# Patient Record
Sex: MALE | Race: WHITE | Employment: OTHER | URBAN - METROPOLITAN AREA
[De-identification: names, ages, dates, MRNs, and addresses within clinical notes are randomized per-mention and may not be internally consistent; named-entity substitution may affect disease eponyms.]

---

## 2017-11-14 ENCOUNTER — APPOINTMENT (OUTPATIENT)
Dept: PHYSICAL THERAPY | Age: 62
End: 2017-11-14

## 2022-07-20 ENCOUNTER — HOSPITAL ENCOUNTER (OUTPATIENT)
Dept: PREADMISSION TESTING | Age: 67
Discharge: HOME | End: 2022-07-20
Payer: MEDICARE

## 2022-07-20 VITALS
SYSTOLIC BLOOD PRESSURE: 110 MMHG | OXYGEN SATURATION: 93 % | HEART RATE: 55 BPM | BODY MASS INDEX: 43.95 KG/M2 | HEIGHT: 68 IN | WEIGHT: 290 LBS | TEMPERATURE: 98.3 F | DIASTOLIC BLOOD PRESSURE: 64 MMHG | RESPIRATION RATE: 24 BRPM

## 2022-07-20 LAB
ALBUMIN SERPL-MCNC: 3.3 G/DL (ref 3.5–5)
ALBUMIN/GLOB SERPL: 1 {RATIO} (ref 1.1–2.2)
ALP SERPL-CCNC: 69 U/L (ref 45–117)
ALT SERPL-CCNC: 68 U/L (ref 12–78)
ANION GAP SERPL CALC-SCNC: 6 MMOL/L (ref 5–15)
APTT PPP: 26 SEC (ref 21.2–34.1)
AST SERPL W P-5'-P-CCNC: 37 U/L (ref 15–37)
ATRIAL RATE: 53 BPM
BASOPHILS # BLD: 0 K/UL (ref 0–0.1)
BASOPHILS NFR BLD: 0 % (ref 0–1)
BILIRUB SERPL-MCNC: 0.8 MG/DL (ref 0.2–1)
BUN SERPL-MCNC: 17 MG/DL (ref 6–20)
BUN/CREAT SERPL: 14 (ref 12–20)
CA-I BLD-MCNC: 8.7 MG/DL (ref 8.5–10.1)
CALCULATED P AXIS, ECG09: 35 DEGREES
CALCULATED R AXIS, ECG10: -33 DEGREES
CALCULATED T AXIS, ECG11: 91 DEGREES
CHLORIDE SERPL-SCNC: 106 MMOL/L (ref 97–108)
CO2 SERPL-SCNC: 26 MMOL/L (ref 21–32)
CREAT SERPL-MCNC: 1.21 MG/DL (ref 0.7–1.3)
DIAGNOSIS, 93000: NORMAL
DIFFERENTIAL METHOD BLD: ABNORMAL
EOSINOPHIL # BLD: 0.1 K/UL (ref 0–0.4)
EOSINOPHIL NFR BLD: 2 % (ref 0–7)
ERYTHROCYTE [DISTWIDTH] IN BLOOD BY AUTOMATED COUNT: 14.3 % (ref 11.5–14.5)
GLOBULIN SER CALC-MCNC: 3.4 G/DL (ref 2–4)
GLUCOSE SERPL-MCNC: 175 MG/DL (ref 65–100)
HCT VFR BLD AUTO: 36.1 % (ref 36.6–50.3)
HGB BLD-MCNC: 12.3 G/DL (ref 12.1–17)
IMM GRANULOCYTES # BLD AUTO: 0 K/UL (ref 0–0.04)
IMM GRANULOCYTES NFR BLD AUTO: 1 % (ref 0–0.5)
INR PPP: 1.1 (ref 0.9–1.1)
LYMPHOCYTES # BLD: 1.8 K/UL (ref 0.8–3.5)
LYMPHOCYTES NFR BLD: 28 % (ref 12–49)
MCH RBC QN AUTO: 33.5 PG (ref 26–34)
MCHC RBC AUTO-ENTMCNC: 34.1 G/DL (ref 30–36.5)
MCV RBC AUTO: 98.4 FL (ref 80–99)
MONOCYTES # BLD: 0.8 K/UL (ref 0–1)
MONOCYTES NFR BLD: 12 % (ref 5–13)
NEUTS SEG # BLD: 3.7 K/UL (ref 1.8–8)
NEUTS SEG NFR BLD: 57 % (ref 32–75)
NRBC # BLD: 0 K/UL (ref 0–0.01)
NRBC BLD-RTO: 0 PER 100 WBC
P-R INTERVAL, ECG05: 212 MS
PLATELET # BLD AUTO: 186 K/UL (ref 150–400)
PMV BLD AUTO: 10 FL (ref 8.9–12.9)
POTASSIUM SERPL-SCNC: 3.8 MMOL/L (ref 3.5–5.1)
PROT SERPL-MCNC: 6.7 G/DL (ref 6.4–8.2)
PROTHROMBIN TIME: 14.6 SEC (ref 11.9–14.6)
Q-T INTERVAL, ECG07: 522 MS
QRS DURATION, ECG06: 130 MS
QTC CALCULATION (BEZET), ECG08: 489 MS
RBC # BLD AUTO: 3.67 M/UL (ref 4.1–5.7)
SODIUM SERPL-SCNC: 138 MMOL/L (ref 136–145)
THERAPEUTIC RANGE,PTTT: NORMAL SEC (ref 82–109)
VENTRICULAR RATE, ECG03: 53 BPM
WBC # BLD AUTO: 6.4 K/UL (ref 4.1–11.1)

## 2022-07-20 PROCEDURE — 85610 PROTHROMBIN TIME: CPT

## 2022-07-20 PROCEDURE — 85730 THROMBOPLASTIN TIME PARTIAL: CPT

## 2022-07-20 PROCEDURE — 80053 COMPREHEN METABOLIC PANEL: CPT

## 2022-07-20 PROCEDURE — 93005 ELECTROCARDIOGRAM TRACING: CPT

## 2022-07-20 PROCEDURE — 85025 COMPLETE CBC W/AUTO DIFF WBC: CPT

## 2022-07-20 PROCEDURE — 36415 COLL VENOUS BLD VENIPUNCTURE: CPT

## 2022-07-20 RX ORDER — LANOLIN ALCOHOL/MO/W.PET/CERES
1000 CREAM (GRAM) TOPICAL DAILY
COMMUNITY

## 2022-07-20 RX ORDER — GABAPENTIN 300 MG/1
300 CAPSULE ORAL 3 TIMES DAILY
COMMUNITY
End: 2022-08-04

## 2022-07-20 RX ORDER — RANOLAZINE 1000 MG/1
1000 TABLET, EXTENDED RELEASE ORAL 2 TIMES DAILY
COMMUNITY

## 2022-07-20 RX ORDER — GUAIFENESIN 100 MG/5ML
81 LIQUID (ML) ORAL DAILY
COMMUNITY

## 2022-07-20 RX ORDER — CARVEDILOL 25 MG/1
25 TABLET ORAL 2 TIMES DAILY WITH MEALS
COMMUNITY
End: 2022-08-17

## 2022-07-20 RX ORDER — ATORVASTATIN CALCIUM 80 MG/1
80 TABLET, FILM COATED ORAL DAILY
COMMUNITY

## 2022-07-20 RX ORDER — BENZONATATE 100 MG/1
100 CAPSULE ORAL
COMMUNITY
End: 2022-08-04

## 2022-07-20 RX ORDER — ISOSORBIDE MONONITRATE 60 MG/1
60 TABLET, EXTENDED RELEASE ORAL
COMMUNITY
End: 2022-08-04

## 2022-07-20 RX ORDER — ZOLPIDEM TARTRATE 10 MG/1
TABLET ORAL
COMMUNITY

## 2022-07-20 RX ORDER — CHROMIUM PICOLINATE 200 MCG
TABLET ORAL
COMMUNITY

## 2022-07-20 RX ORDER — METFORMIN HYDROCHLORIDE 500 MG/1
500 TABLET ORAL 2 TIMES DAILY WITH MEALS
COMMUNITY
End: 2022-08-04

## 2022-07-20 RX ORDER — CETIRIZINE HCL 10 MG
10 TABLET ORAL
COMMUNITY
End: 2022-08-04

## 2022-07-20 RX ORDER — FUROSEMIDE 40 MG/1
40 TABLET ORAL DAILY
COMMUNITY
End: 2022-08-04

## 2022-07-20 RX ORDER — PAROXETINE HYDROCHLORIDE 40 MG/1
40 TABLET, FILM COATED ORAL DAILY
COMMUNITY

## 2022-07-20 RX ORDER — VALSARTAN 160 MG/1
160 TABLET ORAL DAILY
COMMUNITY
End: 2022-08-04

## 2022-07-20 RX ORDER — MELATONIN
1000 DAILY
COMMUNITY

## 2022-07-20 RX ORDER — FLUTICASONE PROPIONATE 50 MCG
2 SPRAY, SUSPENSION (ML) NASAL DAILY
COMMUNITY

## 2022-07-20 RX ORDER — CITALOPRAM 40 MG/1
40 TABLET, FILM COATED ORAL DAILY
COMMUNITY

## 2022-07-26 ENCOUNTER — HOSPITAL ENCOUNTER (OUTPATIENT)
Age: 67
Discharge: HOME | End: 2022-07-26
Attending: STUDENT IN AN ORGANIZED HEALTH CARE EDUCATION/TRAINING PROGRAM | Admitting: STUDENT IN AN ORGANIZED HEALTH CARE EDUCATION/TRAINING PROGRAM
Payer: MEDICARE

## 2022-07-26 VITALS
TEMPERATURE: 98.2 F | WEIGHT: 290 LBS | RESPIRATION RATE: 16 BRPM | DIASTOLIC BLOOD PRESSURE: 89 MMHG | BODY MASS INDEX: 43.95 KG/M2 | SYSTOLIC BLOOD PRESSURE: 156 MMHG | HEART RATE: 55 BPM | OXYGEN SATURATION: 93 % | HEIGHT: 68 IN

## 2022-07-26 DIAGNOSIS — I25.119 CORONARY ARTERY DISEASE WITH ANGINA PECTORIS, UNSPECIFIED VESSEL OR LESION TYPE, UNSPECIFIED WHETHER NATIVE OR TRANSPLANTED HEART (HCC): ICD-10-CM

## 2022-07-26 PROBLEM — I50.9 CHF (CONGESTIVE HEART FAILURE) (HCC): Status: ACTIVE | Noted: 2022-07-26

## 2022-07-26 LAB
GLUCOSE BLD STRIP.AUTO-MCNC: 203 MG/DL (ref 65–117)
GLUCOSE BLD STRIP.AUTO-MCNC: 210 MG/DL (ref 65–117)
PERFORMED BY, TECHID: ABNORMAL
PERFORMED BY, TECHID: ABNORMAL

## 2022-07-26 PROCEDURE — 77030016699 HC CATH ANGI DX INFN1 CARD -A: Performed by: STUDENT IN AN ORGANIZED HEALTH CARE EDUCATION/TRAINING PROGRAM

## 2022-07-26 PROCEDURE — 77030040934 HC CATH DIAG DXTERITY MEDT -A: Performed by: STUDENT IN AN ORGANIZED HEALTH CARE EDUCATION/TRAINING PROGRAM

## 2022-07-26 PROCEDURE — 99152 MOD SED SAME PHYS/QHP 5/>YRS: CPT | Performed by: STUDENT IN AN ORGANIZED HEALTH CARE EDUCATION/TRAINING PROGRAM

## 2022-07-26 PROCEDURE — C1769 GUIDE WIRE: HCPCS | Performed by: STUDENT IN AN ORGANIZED HEALTH CARE EDUCATION/TRAINING PROGRAM

## 2022-07-26 PROCEDURE — 99153 MOD SED SAME PHYS/QHP EA: CPT | Performed by: STUDENT IN AN ORGANIZED HEALTH CARE EDUCATION/TRAINING PROGRAM

## 2022-07-26 PROCEDURE — 93457 R HRT ART/GRFT ANGIO: CPT | Performed by: STUDENT IN AN ORGANIZED HEALTH CARE EDUCATION/TRAINING PROGRAM

## 2022-07-26 PROCEDURE — C1894 INTRO/SHEATH, NON-LASER: HCPCS | Performed by: STUDENT IN AN ORGANIZED HEALTH CARE EDUCATION/TRAINING PROGRAM

## 2022-07-26 PROCEDURE — 77030029997 HC DEV COM RDL R BND TELE -B: Performed by: STUDENT IN AN ORGANIZED HEALTH CARE EDUCATION/TRAINING PROGRAM

## 2022-07-26 PROCEDURE — 82962 GLUCOSE BLOOD TEST: CPT

## 2022-07-26 PROCEDURE — 74011000636 HC RX REV CODE- 636: Performed by: STUDENT IN AN ORGANIZED HEALTH CARE EDUCATION/TRAINING PROGRAM

## 2022-07-26 PROCEDURE — 77030019698 HC SYR ANGI MDLON MRTM -A: Performed by: STUDENT IN AN ORGANIZED HEALTH CARE EDUCATION/TRAINING PROGRAM

## 2022-07-26 PROCEDURE — 74011000250 HC RX REV CODE- 250: Performed by: STUDENT IN AN ORGANIZED HEALTH CARE EDUCATION/TRAINING PROGRAM

## 2022-07-26 PROCEDURE — 94761 N-INVAS EAR/PLS OXIMETRY MLT: CPT

## 2022-07-26 PROCEDURE — 76210000024 HC REC RM PH II 2.5 TO 3 HR: Performed by: STUDENT IN AN ORGANIZED HEALTH CARE EDUCATION/TRAINING PROGRAM

## 2022-07-26 PROCEDURE — 76210000017 HC OR PH I REC 1.5 TO 2 HR: Performed by: STUDENT IN AN ORGANIZED HEALTH CARE EDUCATION/TRAINING PROGRAM

## 2022-07-26 PROCEDURE — 76937 US GUIDE VASCULAR ACCESS: CPT | Performed by: STUDENT IN AN ORGANIZED HEALTH CARE EDUCATION/TRAINING PROGRAM

## 2022-07-26 PROCEDURE — 74011000250 HC RX REV CODE- 250: Performed by: NURSE PRACTITIONER

## 2022-07-26 PROCEDURE — 74011250636 HC RX REV CODE- 250/636: Performed by: STUDENT IN AN ORGANIZED HEALTH CARE EDUCATION/TRAINING PROGRAM

## 2022-07-26 PROCEDURE — C1760 CLOSURE DEV, VASC: HCPCS | Performed by: STUDENT IN AN ORGANIZED HEALTH CARE EDUCATION/TRAINING PROGRAM

## 2022-07-26 PROCEDURE — 94640 AIRWAY INHALATION TREATMENT: CPT

## 2022-07-26 RX ORDER — FENTANYL CITRATE 50 UG/ML
INJECTION, SOLUTION INTRAMUSCULAR; INTRAVENOUS AS NEEDED
Status: DISCONTINUED | OUTPATIENT
Start: 2022-07-26 | End: 2022-07-26 | Stop reason: HOSPADM

## 2022-07-26 RX ORDER — MIDAZOLAM HYDROCHLORIDE 1 MG/ML
INJECTION INTRAMUSCULAR; INTRAVENOUS AS NEEDED
Status: DISCONTINUED | OUTPATIENT
Start: 2022-07-26 | End: 2022-07-26 | Stop reason: HOSPADM

## 2022-07-26 RX ORDER — HEPARIN SODIUM 1000 [USP'U]/ML
INJECTION, SOLUTION INTRAVENOUS; SUBCUTANEOUS AS NEEDED
Status: DISCONTINUED | OUTPATIENT
Start: 2022-07-26 | End: 2022-07-26 | Stop reason: HOSPADM

## 2022-07-26 RX ORDER — SODIUM CHLORIDE 0.9 % (FLUSH) 0.9 %
5-40 SYRINGE (ML) INJECTION AS NEEDED
Status: CANCELLED | OUTPATIENT
Start: 2022-07-26

## 2022-07-26 RX ORDER — VERAPAMIL HYDROCHLORIDE 2.5 MG/ML
INJECTION, SOLUTION INTRAVENOUS AS NEEDED
Status: DISCONTINUED | OUTPATIENT
Start: 2022-07-26 | End: 2022-07-26 | Stop reason: HOSPADM

## 2022-07-26 RX ORDER — LIDOCAINE HYDROCHLORIDE 10 MG/ML
INJECTION INFILTRATION; PERINEURAL AS NEEDED
Status: DISCONTINUED | OUTPATIENT
Start: 2022-07-26 | End: 2022-07-26 | Stop reason: HOSPADM

## 2022-07-26 RX ORDER — SODIUM CHLORIDE 0.9 % (FLUSH) 0.9 %
5-40 SYRINGE (ML) INJECTION EVERY 8 HOURS
Status: CANCELLED | OUTPATIENT
Start: 2022-07-26

## 2022-07-26 RX ORDER — NITROGLYCERIN 5 MG/ML
INJECTION, SOLUTION INTRAVENOUS AS NEEDED
Status: DISCONTINUED | OUTPATIENT
Start: 2022-07-26 | End: 2022-07-26 | Stop reason: HOSPADM

## 2022-07-26 RX ORDER — CLOPIDOGREL BISULFATE 75 MG/1
75 TABLET ORAL
COMMUNITY
End: 2022-08-04

## 2022-07-26 RX ORDER — CALCIUM CITRATE/VITAMIN D3 200MG-6.25
TABLET ORAL SEE ADMIN INSTRUCTIONS
COMMUNITY

## 2022-07-26 RX ORDER — NITROGLYCERIN 20 MG/1
1 PATCH TRANSDERMAL DAILY
COMMUNITY
End: 2022-08-04

## 2022-07-26 RX ORDER — IPRATROPIUM BROMIDE AND ALBUTEROL SULFATE 2.5; .5 MG/3ML; MG/3ML
3 SOLUTION RESPIRATORY (INHALATION)
Status: COMPLETED | OUTPATIENT
Start: 2022-07-26 | End: 2022-07-26

## 2022-07-26 RX ORDER — HEPARIN SODIUM 200 [USP'U]/100ML
INJECTION, SOLUTION INTRAVENOUS
Status: COMPLETED | OUTPATIENT
Start: 2022-07-26 | End: 2022-07-26

## 2022-07-26 RX ORDER — SODIUM CHLORIDE 9 MG/ML
50 INJECTION, SOLUTION INTRAVENOUS CONTINUOUS
Status: DISCONTINUED | OUTPATIENT
Start: 2022-07-26 | End: 2022-07-26 | Stop reason: HOSPADM

## 2022-07-26 RX ORDER — PEN NEEDLE, DIABETIC 31 GX3/16"
NEEDLE, DISPOSABLE MISCELLANEOUS
COMMUNITY
End: 2022-08-04

## 2022-07-26 RX ADMIN — SODIUM CHLORIDE 50 ML/HR: 9 INJECTION, SOLUTION INTRAVENOUS at 09:42

## 2022-07-26 RX ADMIN — IPRATROPIUM BROMIDE AND ALBUTEROL SULFATE 3 ML: .5; 3 SOLUTION RESPIRATORY (INHALATION) at 10:03

## 2022-07-26 NOTE — PROGRESS NOTES
Pt stated has [employer], implanted 4 feb 2016 per pt card. Guidant placed 22 dec 2004. Per pt card.

## 2022-07-26 NOTE — PROGRESS NOTES
Patient states okay to review and give discharge instructions to  Lily Gleason, daughter, [phone].

## 2022-07-26 NOTE — Clinical Note
Contrast Dose Calculator:   Patient's age: 69.   Patient's sex: Male. Patient weight (kg) = 131.5. Creatinine level (mg/dL) = 1.21. Creatinine clearance (mL/min): 107. 17. Contrast concentration (mg/mL) = 370. MACD = 300 mL. Max Contrast dose per Creatinine Cl calculator = 241.13 mL.

## 2022-07-26 NOTE — PROGRESS NOTES
Device interrogation requested by Dr Allison Lazo and Mi Dickerson. Patient apparently has an ICD implanted several years ago and never seen EP since. Also had alarms from the device some time ago. Device type single chamber ICD  : [employer] life 7.5 yrs  Current ying settings: VVI 40 bpm  V pacing <1%  No arrhythmias    RV lead sensin.7 mV  Impedence 1142 ohms HV 70 ohms  Threshold 1.9 V @ 1.0 ms    No changes in programming including tachycardia zones    We will set patient up for remote monitoring and establish with [facility] EP clinic in due course.

## 2022-07-26 NOTE — Clinical Note
Right femoral artery. Accessed unsuccessfully. Micropuncture needle used. Using ultrasound guidance. Number of attempts =  3.

## 2022-07-26 NOTE — PROGRESS NOTES
Lisa Doe NP notified of pt blood sugar, implanted ICD [employer], and pt medications. She stated no new orders.

## 2022-07-26 NOTE — Clinical Note
TRANSFER - OUT REPORT:     Verbal report given to: Dany, (at bedside). Report consisted of patient's Situation, Background, Assessment and   Recommendations(SBAR). Opportunity for questions and clarification was provided. Patient transported with a Registered Nurse. Patient transported to: PACU.

## 2022-07-26 NOTE — DISCHARGE INSTRUCTIONS
[facility]  Cardiac Catheterization Department  [address], [address]  [phone]        Coronary Angiogram: What to Expect at [address] [facility]  A coronary angiogram is a test to examine the large blood vessels of your heart (coronary arteries). The doctor inserted a thin, flexible tube (catheter into a blood vessel in your groin or wrist.  Your groin or wrist may have a bruise and feel sore for a few days after the procedure. You can do light activities around the house. But do not do anything strenuous until your doctor says it is ok. This may be for several days. This care sheet gives you a general idea about how long it will take for you to recover. But each person recovers at a different pace. Follow the steps below to feel better as quickly as possible. How can you care for yourself at home? Activity  If the doctor gave you a sedative: For 24 hours, don't do anything that requires attention to detail, such as going to work, making important decisions, or signing any legal documents. It takes time for the medicine's effects to completely wear off. For your safety, do not drive or operate any machinery that could be dangerous. Wait until the medicine wears off and you can think clearly and react easily. Do not do any strenuous exercise and do not lift, pull, or push anything heavier than 5 pounds (approximately the weight of 1 gallon of milk) until your doctor says it is ok. This may be for several days. You can walk around the house and do light activity, such as cooking. If the catheter was placed in your groin and you must use stairs, just go up and down slowly in as few trips as possible for the first couple of days. If the catheter was placed in your wrist, do not bend your wrist deeply for the first couple of days. A soft wrist-splint may be placed on your wrist as a reminder following the procedure.   Be careful using your hand to get into and out of a chair or bed and when opening doors. Get regular exercise, after being cleared by your cardiologist.  Walking may be a good choice. Try for at least 30 minutes on most days of the week. If you smoke or use smokeless tobacco products, including vaping products, it is extremely important that you quit using these products. Please ask you nurse or doctor for more information about smoking cessation. Diet  Drink plenty of fluids to help you body flush out the dye. If you have kidney, heart, or liver disease and have to limit fluids, talk to your doctor before increasing the amount of fluids you drink. Keep eating a heart-healthy diet that has lots of fruits, vegetables, and whole grains. If you have not been eating this way, talk to your doctor. You also may want to talk to a dietician. This expert can help you to learn about healthy foods and plan meals. Medicines  Your doctor will tell you if and when you can restart your medicines. You will also be given instructions about taking any new medicines. Take these medicines as prescribed. Continue taking your cholesterol lowering medications (statins), if you have been prescribed this. You doctor may prescribe a blood-thinning medicine like aspirin or clopidogrel (Plavix), plasugrel (Effient), or ticagrelor (Brilinta). If you had angioplasty or a stent placement, you must continue aspirin and Plavix, Effient, or Brilinta. It is very important that you take these medicines exactly as directed to keep the coronary artery open and reduce your risk of heart attack. Be safe with medicines. Call your doctor if you think you are having a problem with taking or obtaining your medicines, notify your doctor immediately. You cannot afford to miss your medications. Do not stop taking these medications without speaking to your cardiologist first.   Do not strain to have a bowel movement.   Ask your doctor if you feel you may need a stool softener of laxative. Care of the catheter site  For 1 or 2 days, keep a bandage over the spot where the catheter(s) was inserted. The bandage probably will fall off in this time. Put ice or a cold pack on the area for 10 to 20 minutes at a time to help with soreness of swelling. Place a thin cloth between the ice and your skin. You may shower 24 to 48 hours after the procedure, if your doctor says it is okay. Pat the incision dry with a clean towel afterwards. Do not soak the catheter site until it is healed. Don't take a bath for 1 week, or until your doctor tells you it is okay to do so. The use of lotions and powders is not recommended at the site until it is completely healed. Watch for bleeding from the site. A small amount of blood (up to the size of a quarter) on the bandage can be normal.  If you cough, sneeze, or laugh vigorously, apply direct pressure with your hand over the catheter site. If you notice a little bit of blood from the catheter site (similar to a paper cut or shaving nick), lie down and press firmly on the area for 15 minutes to try and make it stop bleeding. If the bleeding does not stop, call your doctor or seek immediate medical care. If you notice heavy bleeding at the site that has either saturated the bandage or is squirting, lie down, press firmly on the site, and have someone call 911. Follow-up care is a key part of your treatment and safety. Be sure to make and go to all appointments and call your doctor if you are having problems. It's also a good idea to know your test results and keep a list of medicines you take. When should you call for help? Call 911 anytime you think you may need emergency care. For example, call if:  You passed out (lost consciousness). You have severe trouble breathing. You have sudden chest pain and shortness of breath, or you cough up blood.   Call 911 if you have symptoms of a heart attack, such as:  Chest Pain, pressure, squeezing, or burning. Sweating. Shortness of breath or difficulty breathing. Nausea or vomiting. Jaw pain, shoulder pain, or arm pain in one or both sides. Feelings of fullness. Excessive fatigue or weakness. New onset anxiety. New onset of upper back pain. Dizziness or lightheadedness. A fast or uneven pulse. Call 911 if you have been diagnosed with angina, and you have symptoms that do not go away with rest or are not getting better within 5 minutes of taking a dose of your nitroglycerin. After you call 911, the  may tell you to chew 1 adult-strength (325 mg) of 2 to 4 low-dose aspirin (81 mg). Wait for ambulance. Do not drive yourself. Call your doctor now or seek immediate medical care if:  You are bleeding from the area where the catheter was inserted. You have a fast-growing, painful lump at the catheter site. You have signs of infection, such as  Increased pain, swelling, warmth or redness at the catheter site. Red streaks leading from the catheter site. Pus draining from the catheter site. A fever. Your leg or hand is painful, looks blue, or feels cold, numb, or tingly. Watch closely for changes in your health and be sure to contact your doctor if you have any problems.

## 2022-07-26 NOTE — Clinical Note
Right femoral artery. Accessed unsuccessfully. Femoral access needle used. Using ultrasound guidance.  Number of attempts =  1.

## 2022-08-03 ENCOUNTER — HOSPITAL ENCOUNTER (INPATIENT)
Age: 67
LOS: 7 days | Discharge: HOME HEALTH | DRG: 515 | End: 2022-08-17
Attending: STUDENT IN AN ORGANIZED HEALTH CARE EDUCATION/TRAINING PROGRAM | Admitting: INTERNAL MEDICINE
Payer: MEDICARE

## 2022-08-03 ENCOUNTER — APPOINTMENT (OUTPATIENT)
Dept: GENERAL RADIOLOGY | Age: 67
DRG: 515 | End: 2022-08-03
Attending: STUDENT IN AN ORGANIZED HEALTH CARE EDUCATION/TRAINING PROGRAM
Payer: MEDICARE

## 2022-08-03 ENCOUNTER — APPOINTMENT (OUTPATIENT)
Dept: CT IMAGING | Age: 67
DRG: 515 | End: 2022-08-03
Attending: STUDENT IN AN ORGANIZED HEALTH CARE EDUCATION/TRAINING PROGRAM
Payer: MEDICARE

## 2022-08-03 DIAGNOSIS — S32.030G CLOSED COMPRESSION FRACTURE OF L3 LUMBAR VERTEBRA WITH DELAYED HEALING, SUBSEQUENT ENCOUNTER: ICD-10-CM

## 2022-08-03 DIAGNOSIS — R55 SYNCOPE AND COLLAPSE: Primary | ICD-10-CM
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
## 2022-08-03 LAB
ABO + RH BLD: NORMAL
ALBUMIN SERPL-MCNC: 3.6 G/DL (ref 3.5–5)
ALBUMIN/GLOB SERPL: 1.1 {RATIO} (ref 1.1–2.2)
ALP SERPL-CCNC: 68 U/L (ref 45–117)
ALT SERPL-CCNC: 59 U/L (ref 12–78)
ANION GAP SERPL CALC-SCNC: 12 MMOL/L (ref 5–15)
AST SERPL W P-5'-P-CCNC: 43 U/L (ref 15–37)
BILIRUB SERPL-MCNC: 0.5 MG/DL (ref 0.2–1)
BLOOD GROUP ANTIBODIES SERPL: NEGATIVE
BUN SERPL-MCNC: 30 MG/DL (ref 6–20)
BUN/CREAT SERPL: 14 (ref 12–20)
CA-I BLD-MCNC: 8.8 MG/DL (ref 8.5–10.1)
CHLORIDE SERPL-SCNC: 106 MMOL/L (ref 97–108)
CO2 SERPL-SCNC: 21 MMOL/L (ref 21–32)
CREAT SERPL-MCNC: 2.15 MG/DL (ref 0.7–1.3)
ERYTHROCYTE [DISTWIDTH] IN BLOOD BY AUTOMATED COUNT: 14 % (ref 11.5–14.5)
ETHANOL SERPL-MCNC: 150 MG/DL
GLOBULIN SER CALC-MCNC: 3.4 G/DL (ref 2–4)
GLUCOSE SERPL-MCNC: 157 MG/DL (ref 65–100)
HCT VFR BLD AUTO: 37.2 % (ref 36.6–50.3)
HGB BLD-MCNC: 12.7 G/DL (ref 12.1–17)
LACTATE SERPL-SCNC: 4.1 MMOL/L (ref 0.4–2)
LIPASE SERPL-CCNC: 71 U/L (ref 73–393)
MCH RBC QN AUTO: 33.7 PG (ref 26–34)
MCHC RBC AUTO-ENTMCNC: 34.1 G/DL (ref 30–36.5)
MCV RBC AUTO: 98.7 FL (ref 80–99)
NRBC # BLD: 0 K/UL (ref 0–0.01)
NRBC BLD-RTO: 0 PER 100 WBC
PLATELET # BLD AUTO: 206 K/UL (ref 150–400)
PMV BLD AUTO: 9.6 FL (ref 8.9–12.9)
POTASSIUM SERPL-SCNC: 3.5 MMOL/L (ref 3.5–5.1)
PROT SERPL-MCNC: 7 G/DL (ref 6.4–8.2)
RBC # BLD AUTO: 3.77 M/UL (ref 4.1–5.7)
SODIUM SERPL-SCNC: 139 MMOL/L (ref 136–145)
SPECIMEN EXP DATE BLD: NORMAL
TROPONIN-HIGH SENSITIVITY: 15 NG/L (ref 0–76)
WBC # BLD AUTO: 7.5 K/UL (ref 4.1–11.1)

## 2022-08-03 PROCEDURE — 70450 CT HEAD/BRAIN W/O DYE: CPT

## 2022-08-03 PROCEDURE — 83605 ASSAY OF LACTIC ACID: CPT

## 2022-08-03 PROCEDURE — 82077 ASSAY SPEC XCP UR&BREATH IA: CPT

## 2022-08-03 PROCEDURE — 96374 THER/PROPH/DIAG INJ IV PUSH: CPT

## 2022-08-03 PROCEDURE — 85027 COMPLETE CBC AUTOMATED: CPT

## 2022-08-03 PROCEDURE — 93005 ELECTROCARDIOGRAM TRACING: CPT

## 2022-08-03 PROCEDURE — 86900 BLOOD TYPING SEROLOGIC ABO: CPT

## 2022-08-03 PROCEDURE — 84443 ASSAY THYROID STIM HORMONE: CPT

## 2022-08-03 PROCEDURE — 99285 EMERGENCY DEPT VISIT HI MDM: CPT

## 2022-08-03 PROCEDURE — 83690 ASSAY OF LIPASE: CPT

## 2022-08-03 PROCEDURE — 71275 CT ANGIOGRAPHY CHEST: CPT

## 2022-08-03 PROCEDURE — 74011000636 HC RX REV CODE- 636: Performed by: STUDENT IN AN ORGANIZED HEALTH CARE EDUCATION/TRAINING PROGRAM

## 2022-08-03 PROCEDURE — 84484 ASSAY OF TROPONIN QUANT: CPT

## 2022-08-03 PROCEDURE — 71045 X-RAY EXAM CHEST 1 VIEW: CPT

## 2022-08-03 PROCEDURE — 36415 COLL VENOUS BLD VENIPUNCTURE: CPT

## 2022-08-03 PROCEDURE — 74011250636 HC RX REV CODE- 250/636: Performed by: STUDENT IN AN ORGANIZED HEALTH CARE EDUCATION/TRAINING PROGRAM

## 2022-08-03 PROCEDURE — 96372 THER/PROPH/DIAG INJ SC/IM: CPT

## 2022-08-03 PROCEDURE — 80053 COMPREHEN METABOLIC PANEL: CPT

## 2022-08-03 PROCEDURE — 72125 CT NECK SPINE W/O DYE: CPT

## 2022-08-03 RX ORDER — MORPHINE SULFATE 4 MG/ML
4 INJECTION INTRAVENOUS ONCE
Status: COMPLETED | OUTPATIENT
Start: 2022-08-03 | End: 2022-08-03

## 2022-08-03 RX ORDER — FENTANYL CITRATE 50 UG/ML
50 INJECTION, SOLUTION INTRAMUSCULAR; INTRAVENOUS
Status: COMPLETED | OUTPATIENT
Start: 2022-08-03 | End: 2022-08-03

## 2022-08-03 RX ADMIN — SODIUM CHLORIDE 1000 ML: 9 INJECTION, SOLUTION INTRAVENOUS at 22:52

## 2022-08-03 RX ADMIN — FENTANYL CITRATE 50 MCG: 50 INJECTION INTRAMUSCULAR; INTRAVENOUS at 21:20

## 2022-08-03 RX ADMIN — IOPAMIDOL 100 ML: 755 INJECTION, SOLUTION INTRAVENOUS at 21:40

## 2022-08-03 RX ADMIN — MORPHINE SULFATE 4 MG: 4 INJECTION INTRAVENOUS at 22:49

## 2022-08-04 ENCOUNTER — APPOINTMENT (OUTPATIENT)
Dept: NON INVASIVE DIAGNOSTICS | Age: 67
DRG: 515 | End: 2022-08-04
Attending: HOSPITALIST
Payer: MEDICARE

## 2022-08-04 PROBLEM — R55 SYNCOPE: Status: ACTIVE | Noted: 2022-08-04

## 2022-08-04 PROBLEM — R42 DIZZINESS: Status: ACTIVE | Noted: 2022-08-04

## 2022-08-04 LAB
ANION GAP SERPL CALC-SCNC: 6 MMOL/L (ref 5–15)
ATRIAL RATE: 65 BPM
BUN SERPL-MCNC: 16 MG/DL (ref 6–20)
BUN/CREAT SERPL: 16 (ref 12–20)
CA-I BLD-MCNC: 9 MG/DL (ref 8.5–10.1)
CALCULATED P AXIS, ECG09: 62 DEGREES
CALCULATED R AXIS, ECG10: -13 DEGREES
CALCULATED T AXIS, ECG11: 109 DEGREES
CHLORIDE SERPL-SCNC: 106 MMOL/L (ref 97–108)
CO2 SERPL-SCNC: 25 MMOL/L (ref 21–32)
CREAT SERPL-MCNC: 0.98 MG/DL (ref 0.7–1.3)
DIAGNOSIS, 93000: NORMAL
ERYTHROCYTE [DISTWIDTH] IN BLOOD BY AUTOMATED COUNT: 14.2 % (ref 11.5–14.5)
GLUCOSE BLD STRIP.AUTO-MCNC: 155 MG/DL (ref 65–117)
GLUCOSE BLD STRIP.AUTO-MCNC: 182 MG/DL (ref 65–117)
GLUCOSE BLD STRIP.AUTO-MCNC: 207 MG/DL (ref 65–117)
GLUCOSE SERPL-MCNC: 174 MG/DL (ref 65–100)
HCT VFR BLD AUTO: 37.6 % (ref 36.6–50.3)
HGB BLD-MCNC: 12.9 G/DL (ref 12.1–17)
LACTATE SERPL-SCNC: 2.4 MMOL/L (ref 0.4–2)
MCH RBC QN AUTO: 33.8 PG (ref 26–34)
MCHC RBC AUTO-ENTMCNC: 34.3 G/DL (ref 30–36.5)
MCV RBC AUTO: 98.4 FL (ref 80–99)
NRBC # BLD: 0 K/UL (ref 0–0.01)
NRBC BLD-RTO: 0 PER 100 WBC
P-R INTERVAL, ECG05: 220 MS
PERFORMED BY, TECHID: ABNORMAL
PLATELET # BLD AUTO: 175 K/UL (ref 150–400)
PMV BLD AUTO: 9.8 FL (ref 8.9–12.9)
POTASSIUM SERPL-SCNC: 3.6 MMOL/L (ref 3.5–5.1)
Q-T INTERVAL, ECG07: 430 MS
QRS DURATION, ECG06: 138 MS
QTC CALCULATION (BEZET), ECG08: 447 MS
RBC # BLD AUTO: 3.82 M/UL (ref 4.1–5.7)
SODIUM SERPL-SCNC: 137 MMOL/L (ref 136–145)
TSH SERPL DL<=0.05 MIU/L-ACNC: 3.83 UIU/ML (ref 0.36–3.74)
VENTRICULAR RATE, ECG03: 65 BPM
WBC # BLD AUTO: 10.9 K/UL (ref 4.1–11.1)

## 2022-08-04 PROCEDURE — 83605 ASSAY OF LACTIC ACID: CPT

## 2022-08-04 PROCEDURE — G0378 HOSPITAL OBSERVATION PER HR: HCPCS

## 2022-08-04 PROCEDURE — 96375 TX/PRO/DX INJ NEW DRUG ADDON: CPT

## 2022-08-04 PROCEDURE — 77010033678 HC OXYGEN DAILY

## 2022-08-04 PROCEDURE — 74011250636 HC RX REV CODE- 250/636: Performed by: INTERNAL MEDICINE

## 2022-08-04 PROCEDURE — 82962 GLUCOSE BLOOD TEST: CPT

## 2022-08-04 PROCEDURE — 74011250637 HC RX REV CODE- 250/637: Performed by: HOSPITALIST

## 2022-08-04 PROCEDURE — 74011000250 HC RX REV CODE- 250: Performed by: HOSPITALIST

## 2022-08-04 PROCEDURE — 96372 THER/PROPH/DIAG INJ SC/IM: CPT

## 2022-08-04 PROCEDURE — 94761 N-INVAS EAR/PLS OXIMETRY MLT: CPT

## 2022-08-04 PROCEDURE — 5A09457 ASSISTANCE WITH RESPIRATORY VENTILATION, 24-96 CONSECUTIVE HOURS, CONTINUOUS POSITIVE AIRWAY PRESSURE: ICD-10-PCS | Performed by: INTERNAL MEDICINE

## 2022-08-04 PROCEDURE — 85027 COMPLETE CBC AUTOMATED: CPT

## 2022-08-04 PROCEDURE — 74011250636 HC RX REV CODE- 250/636: Performed by: STUDENT IN AN ORGANIZED HEALTH CARE EDUCATION/TRAINING PROGRAM

## 2022-08-04 PROCEDURE — 74011250636 HC RX REV CODE- 250/636: Performed by: HOSPITALIST

## 2022-08-04 PROCEDURE — 36415 COLL VENOUS BLD VENIPUNCTURE: CPT

## 2022-08-04 PROCEDURE — 94660 CPAP INITIATION&MGMT: CPT

## 2022-08-04 PROCEDURE — C8929 TTE W OR WO FOL WCON,DOPPLER: HCPCS

## 2022-08-04 PROCEDURE — 74011636637 HC RX REV CODE- 636/637: Performed by: HOSPITALIST

## 2022-08-04 PROCEDURE — 80048 BASIC METABOLIC PNL TOTAL CA: CPT

## 2022-08-04 PROCEDURE — 96376 TX/PRO/DX INJ SAME DRUG ADON: CPT

## 2022-08-04 RX ORDER — ENOXAPARIN SODIUM 100 MG/ML
40 INJECTION SUBCUTANEOUS EVERY 24 HOURS
Status: DISCONTINUED | OUTPATIENT
Start: 2022-08-04 | End: 2022-08-04 | Stop reason: SDUPTHER

## 2022-08-04 RX ORDER — ALBUTEROL SULFATE 90 UG/1
2 AEROSOL, METERED RESPIRATORY (INHALATION)
Status: DISCONTINUED | OUTPATIENT
Start: 2022-08-04 | End: 2022-08-17 | Stop reason: HOSPADM

## 2022-08-04 RX ORDER — FLUTICASONE PROPIONATE 50 MCG
2 SPRAY, SUSPENSION (ML) NASAL DAILY
Status: DISCONTINUED | OUTPATIENT
Start: 2022-08-04 | End: 2022-08-17 | Stop reason: HOSPADM

## 2022-08-04 RX ORDER — LANOLIN ALCOHOL/MO/W.PET/CERES
1000 CREAM (GRAM) TOPICAL DAILY
Status: DISCONTINUED | OUTPATIENT
Start: 2022-08-04 | End: 2022-08-17 | Stop reason: HOSPADM

## 2022-08-04 RX ORDER — INSULIN LISPRO 100 [IU]/ML
INJECTION, SOLUTION INTRAVENOUS; SUBCUTANEOUS
Status: DISCONTINUED | OUTPATIENT
Start: 2022-08-04 | End: 2022-08-17 | Stop reason: HOSPADM

## 2022-08-04 RX ORDER — GUAIFENESIN 100 MG/5ML
81 LIQUID (ML) ORAL DAILY
Status: DISCONTINUED | OUTPATIENT
Start: 2022-08-04 | End: 2022-08-17 | Stop reason: HOSPADM

## 2022-08-04 RX ORDER — AMLODIPINE BESYLATE 5 MG/1
10 TABLET ORAL DAILY
Status: DISCONTINUED | OUTPATIENT
Start: 2022-08-04 | End: 2022-08-17 | Stop reason: HOSPADM

## 2022-08-04 RX ORDER — INSULIN GLARGINE 100 [IU]/ML
15 INJECTION, SOLUTION SUBCUTANEOUS
COMMUNITY
Start: 2022-08-03

## 2022-08-04 RX ORDER — ENOXAPARIN SODIUM 100 MG/ML
30 INJECTION SUBCUTANEOUS EVERY 12 HOURS
Status: DISCONTINUED | OUTPATIENT
Start: 2022-08-04 | End: 2022-08-17 | Stop reason: HOSPADM

## 2022-08-04 RX ORDER — RANOLAZINE 500 MG/1
1000 TABLET, EXTENDED RELEASE ORAL 2 TIMES DAILY
Status: DISCONTINUED | OUTPATIENT
Start: 2022-08-04 | End: 2022-08-17 | Stop reason: HOSPADM

## 2022-08-04 RX ORDER — HYDROMORPHONE HYDROCHLORIDE 1 MG/ML
1 INJECTION, SOLUTION INTRAMUSCULAR; INTRAVENOUS; SUBCUTANEOUS
Status: DISPENSED | OUTPATIENT
Start: 2022-08-04 | End: 2022-08-07

## 2022-08-04 RX ORDER — MAGNESIUM SULFATE 100 %
4 CRYSTALS MISCELLANEOUS AS NEEDED
Status: DISCONTINUED | OUTPATIENT
Start: 2022-08-04 | End: 2022-08-17 | Stop reason: HOSPADM

## 2022-08-04 RX ORDER — DEXTROSE MONOHYDRATE 100 MG/ML
0-250 INJECTION, SOLUTION INTRAVENOUS AS NEEDED
Status: DISCONTINUED | OUTPATIENT
Start: 2022-08-04 | End: 2022-08-17 | Stop reason: HOSPADM

## 2022-08-04 RX ORDER — POLYETHYLENE GLYCOL 3350 17 G/17G
17 POWDER, FOR SOLUTION ORAL DAILY PRN
Status: DISCONTINUED | OUTPATIENT
Start: 2022-08-04 | End: 2022-08-17 | Stop reason: HOSPADM

## 2022-08-04 RX ORDER — VITAMIN B COMPLEX
1 CAPSULE ORAL DAILY
Status: DISCONTINUED | OUTPATIENT
Start: 2022-08-04 | End: 2022-08-17 | Stop reason: HOSPADM

## 2022-08-04 RX ORDER — ACETAMINOPHEN 325 MG/1
650 TABLET ORAL
Status: DISCONTINUED | OUTPATIENT
Start: 2022-08-04 | End: 2022-08-10

## 2022-08-04 RX ORDER — INSULIN GLARGINE 100 [IU]/ML
16 INJECTION, SOLUTION SUBCUTANEOUS
Status: DISCONTINUED | OUTPATIENT
Start: 2022-08-04 | End: 2022-08-17 | Stop reason: HOSPADM

## 2022-08-04 RX ORDER — MELATONIN
1000 DAILY
Status: DISCONTINUED | OUTPATIENT
Start: 2022-08-04 | End: 2022-08-17 | Stop reason: HOSPADM

## 2022-08-04 RX ORDER — ZOLPIDEM TARTRATE 5 MG/1
5 TABLET ORAL
Status: DISCONTINUED | OUTPATIENT
Start: 2022-08-04 | End: 2022-08-17 | Stop reason: HOSPADM

## 2022-08-04 RX ORDER — MORPHINE SULFATE 4 MG/ML
4 INJECTION INTRAVENOUS ONCE
Status: COMPLETED | OUTPATIENT
Start: 2022-08-04 | End: 2022-08-04

## 2022-08-04 RX ORDER — CITALOPRAM 20 MG/1
40 TABLET, FILM COATED ORAL DAILY
Status: DISCONTINUED | OUTPATIENT
Start: 2022-08-04 | End: 2022-08-17 | Stop reason: HOSPADM

## 2022-08-04 RX ORDER — SODIUM CHLORIDE 0.9 % (FLUSH) 0.9 %
5-40 SYRINGE (ML) INJECTION EVERY 8 HOURS
Status: DISCONTINUED | OUTPATIENT
Start: 2022-08-04 | End: 2022-08-13

## 2022-08-04 RX ORDER — ALBUTEROL SULFATE 90 UG/1
2 AEROSOL, METERED RESPIRATORY (INHALATION)
COMMUNITY
Start: 2022-06-13

## 2022-08-04 RX ORDER — DOCUSATE SODIUM 100 MG/1
100 CAPSULE, LIQUID FILLED ORAL 2 TIMES DAILY
Status: DISCONTINUED | OUTPATIENT
Start: 2022-08-04 | End: 2022-08-17 | Stop reason: HOSPADM

## 2022-08-04 RX ORDER — CALCIUM CARBONATE/VITAMIN D3 600MG-5MCG
1 TABLET ORAL 2 TIMES DAILY WITH MEALS
Status: DISCONTINUED | OUTPATIENT
Start: 2022-08-04 | End: 2022-08-17 | Stop reason: HOSPADM

## 2022-08-04 RX ORDER — TRAMADOL HYDROCHLORIDE 50 MG/1
50 TABLET ORAL
Status: DISCONTINUED | OUTPATIENT
Start: 2022-08-04 | End: 2022-08-10

## 2022-08-04 RX ORDER — HYDROMORPHONE HYDROCHLORIDE 1 MG/ML
1 INJECTION, SOLUTION INTRAMUSCULAR; INTRAVENOUS; SUBCUTANEOUS ONCE
Status: COMPLETED | OUTPATIENT
Start: 2022-08-04 | End: 2022-08-04

## 2022-08-04 RX ORDER — AMLODIPINE BESYLATE 10 MG/1
10 TABLET ORAL DAILY
COMMUNITY
Start: 2022-07-20

## 2022-08-04 RX ORDER — PAROXETINE HYDROCHLORIDE 20 MG/1
40 TABLET, FILM COATED ORAL DAILY
Status: DISCONTINUED | OUTPATIENT
Start: 2022-08-04 | End: 2022-08-17 | Stop reason: HOSPADM

## 2022-08-04 RX ORDER — ATORVASTATIN CALCIUM 40 MG/1
80 TABLET, FILM COATED ORAL DAILY
Status: DISCONTINUED | OUTPATIENT
Start: 2022-08-04 | End: 2022-08-17 | Stop reason: HOSPADM

## 2022-08-04 RX ORDER — SODIUM CHLORIDE 0.9 % (FLUSH) 0.9 %
5-40 SYRINGE (ML) INJECTION AS NEEDED
Status: DISCONTINUED | OUTPATIENT
Start: 2022-08-04 | End: 2022-08-17 | Stop reason: HOSPADM

## 2022-08-04 RX ADMIN — Medication 1000 UNITS: at 09:26

## 2022-08-04 RX ADMIN — SODIUM CHLORIDE, PRESERVATIVE FREE 10 ML: 5 INJECTION INTRAVENOUS at 22:38

## 2022-08-04 RX ADMIN — CITALOPRAM HYDROBROMIDE 40 MG: 20 TABLET ORAL at 09:26

## 2022-08-04 RX ADMIN — HYDROMORPHONE HYDROCHLORIDE 1 MG: 1 INJECTION, SOLUTION INTRAMUSCULAR; INTRAVENOUS; SUBCUTANEOUS at 20:17

## 2022-08-04 RX ADMIN — CYANOCOBALAMIN TAB 1000 MCG 1000 MCG: 1000 TAB at 09:27

## 2022-08-04 RX ADMIN — RANOLAZINE 1000 MG: 500 TABLET, FILM COATED, EXTENDED RELEASE ORAL at 09:27

## 2022-08-04 RX ADMIN — ENOXAPARIN SODIUM 30 MG: 100 INJECTION SUBCUTANEOUS at 22:38

## 2022-08-04 RX ADMIN — TRAMADOL HYDROCHLORIDE 50 MG: 50 TABLET, COATED ORAL at 09:26

## 2022-08-04 RX ADMIN — ENOXAPARIN SODIUM 30 MG: 100 INJECTION SUBCUTANEOUS at 14:06

## 2022-08-04 RX ADMIN — HYDROMORPHONE HYDROCHLORIDE 1 MG: 1 INJECTION, SOLUTION INTRAMUSCULAR; INTRAVENOUS; SUBCUTANEOUS at 05:07

## 2022-08-04 RX ADMIN — MORPHINE SULFATE 4 MG: 4 INJECTION INTRAVENOUS at 01:18

## 2022-08-04 RX ADMIN — TRAMADOL HYDROCHLORIDE 50 MG: 50 TABLET, COATED ORAL at 15:19

## 2022-08-04 RX ADMIN — RANOLAZINE 1000 MG: 500 TABLET, FILM COATED, EXTENDED RELEASE ORAL at 22:38

## 2022-08-04 RX ADMIN — DOCUSATE SODIUM 100 MG: 100 CAPSULE, LIQUID FILLED ORAL at 09:27

## 2022-08-04 RX ADMIN — INSULIN LISPRO 4 UNITS: 100 INJECTION, SOLUTION INTRAVENOUS; SUBCUTANEOUS at 09:27

## 2022-08-04 RX ADMIN — DOCUSATE SODIUM 100 MG: 100 CAPSULE, LIQUID FILLED ORAL at 22:38

## 2022-08-04 RX ADMIN — ATORVASTATIN CALCIUM 80 MG: 40 TABLET, FILM COATED ORAL at 09:27

## 2022-08-04 RX ADMIN — SODIUM CHLORIDE, PRESERVATIVE FREE 10 ML: 5 INJECTION INTRAVENOUS at 14:07

## 2022-08-04 RX ADMIN — PAROXETINE HYDROCHLORIDE 40 MG: 20 TABLET, FILM COATED ORAL at 14:06

## 2022-08-04 RX ADMIN — Medication 1 CAPSULE: at 09:26

## 2022-08-04 RX ADMIN — AMLODIPINE BESYLATE 10 MG: 5 TABLET ORAL at 09:26

## 2022-08-04 RX ADMIN — INSULIN LISPRO 3 UNITS: 100 INJECTION, SOLUTION INTRAVENOUS; SUBCUTANEOUS at 18:19

## 2022-08-04 RX ADMIN — Medication 1 TABLET: at 09:27

## 2022-08-04 RX ADMIN — ASPIRIN 81 MG CHEWABLE TABLET 81 MG: 81 TABLET CHEWABLE at 09:27

## 2022-08-04 NOTE — PROGRESS NOTES
Spoke with pt daughter [address] who states she has been assisting with pt's care at home and has noticed decline in pt function and ability to perform daily activities. Pt daughter prefer for pt to go to SNF/IRF at discharge for rehabilitation.
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
## 2022-08-04 NOTE — PROGRESS NOTES
State Observation Notice (SON)/ [place] Outpatient Observation Notice (Kareem Backers) provided to patient/representative with verbal explanation of the notice. Time allotted for questions regarding the notice. Patient /representative provided a completed copy of the SON/VOON notice. Copy placed on bedside chart.

## 2022-08-04 NOTE — ED NOTES
.. TRANSFER - OUT REPORT:    Verbal report given to KASSIE Posada (name) on Petey Colmenares  being transferred to [address] [place] [address] on 5W (unit) for routine progression of care       Report consisted of patients Situation, Background, Assessment and   Recommendations(SBAR). Information from the following report(s) SBAR, ED Summary, and MAR was reviewed with the receiving nurse. Lines:   Peripheral IV 08/03/22 Left Forearm (Active)   Site Assessment Clean, dry, & intact 08/03/22 2115   Phlebitis Assessment 0 08/03/22 2115   Infiltration Assessment 0 08/03/22 2115   Dressing Status Clean, dry, & intact 08/03/22 2115   Hub Color/Line Status Green 08/03/22 2115   Action Taken Blood drawn 08/03/22 2115       Peripheral IV 08/03/22 Right Forearm (Active)   Site Assessment Clean, dry, & intact 08/03/22 2116   Phlebitis Assessment 0 08/03/22 2116   Infiltration Assessment 0 08/03/22 2116   Dressing Status Clean, dry, & intact 08/03/22 2116   Hub Color/Line Status Pink 08/03/22 2116   Action Taken Blood drawn 08/03/22 2116        Opportunity for questions and clarification was provided.       Patient transported with:   [employer]
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
## 2022-08-04 NOTE — PROGRESS NOTES
Reason for Admission:  Syncope                     RUR Score:   N/A                  Plan for utilizing home health: Declines         PCP: First and Last name:  Shelby Abel MD     Name of Practice:    Are you a current patient: Yes/No:    Approximate date of last visit: A few months ago   Can you participate in a virtual visit with your PCP:                     Current Advanced Directive/Advance Care Plan: Full Code  CM confirmed with patient that he is a Full Code    Healthcare Decision Maker:   Click here to complete [employer] including selection of the Healthcare Decision Maker Relationship (ie \"Primary\")           DaughterAlex, [phone]                  Transition of Care Plan:                    CM met with patient at bedside. Patient reports that he lives at home with his daughter. He lives in the basement and there are no steps or ramp to enter his section of the home. Patient has a walker and CPAP at home, but is typically independent in care and ambulation. Patient has never had HH, IRF or SNF services in the past.  Patient's daughter will be his ride home at discharge and patient can transport himself to follow up appointments at discharge. Patient uses the [facility] in [place] . Current Dispo: Home/self.

## 2022-08-05 LAB
ANION GAP SERPL CALC-SCNC: 7 MMOL/L (ref 5–15)
BUN SERPL-MCNC: 15 MG/DL (ref 6–20)
BUN/CREAT SERPL: 14 (ref 12–20)
CA-I BLD-MCNC: 8.9 MG/DL (ref 8.5–10.1)
CHLORIDE SERPL-SCNC: 105 MMOL/L (ref 97–108)
CO2 SERPL-SCNC: 25 MMOL/L (ref 21–32)
CREAT SERPL-MCNC: 1.05 MG/DL (ref 0.7–1.3)
ECHO AO ASC DIAM: 4.3 CM
ECHO AO ROOT DIAM: 3.6 CM
ECHO AO SINUS VALSALVA DIAM: 3.3 CM
ECHO AV AREA PEAK VELOCITY: 3.1 CM2
ECHO AV AREA VTI: 2.9 CM2
ECHO AV MEAN GRADIENT: 5 MMHG
ECHO AV MEAN VELOCITY: 1 M/S
ECHO AV PEAK GRADIENT: 10 MMHG
ECHO AV PEAK VELOCITY: 1.6 M/S
ECHO AV VELOCITY RATIO: 0.69
ECHO AV VTI: 25.1 CM
ECHO EST RA PRESSURE: 8 MMHG
ECHO LA DIAMETER: 4.5 CM
ECHO LA TO AORTIC ROOT RATIO: 1.25
ECHO LV E' LATERAL VELOCITY: 13 CM/S
ECHO LV E' SEPTAL VELOCITY: 10 CM/S
ECHO LV FRACTIONAL SHORTENING: 3 % (ref 28–44)
ECHO LV INTERNAL DIMENSION DIASTOLIC: 6 CM (ref 4.2–5.9)
ECHO LV INTERNAL DIMENSION SYSTOLIC: 5.8 CM
ECHO LV IVSD: 1.2 CM (ref 0.6–1)
ECHO LV MASS 2D: 350.1 G (ref 88–224)
ECHO LV POSTERIOR WALL DIASTOLIC: 1.4 CM (ref 0.6–1)
ECHO LV RELATIVE WALL THICKNESS RATIO: 0.47
ECHO LVOT AREA: 4.5 CM2
ECHO LVOT AV VTI INDEX: 0.64
ECHO LVOT DIAM: 2.4 CM
ECHO LVOT MEAN GRADIENT: 3 MMHG
ECHO LVOT PEAK GRADIENT: 5 MMHG
ECHO LVOT PEAK VELOCITY: 1.1 M/S
ECHO LVOT SV: 72.3 ML
ECHO LVOT VTI: 16 CM
ECHO MV A VELOCITY: 1.22 M/S
ECHO MV E DECELERATION TIME (DT): 473 MS
ECHO MV E VELOCITY: 1.04 M/S
ECHO MV E/A RATIO: 0.85
ECHO MV E/E' LATERAL: 8
ECHO MV E/E' RATIO (AVERAGED): 9.2
ECHO MV E/E' SEPTAL: 10.4
ECHO MV REGURGITANT PEAK GRADIENT: 9 MMHG
ECHO MV REGURGITANT PEAK VELOCITY: 1.5 M/S
ECHO MV REGURGITANT VTIA: 29 CM
ECHO PV MAX VELOCITY: 1.2 M/S
ECHO PV PEAK GRADIENT: 6 MMHG
ECHO RIGHT VENTRICULAR SYSTOLIC PRESSURE (RVSP): 40 MMHG
ECHO RV TAPSE: 1.9 CM (ref 1.7–?)
ECHO TV REGURGITANT MAX VELOCITY: 2.82 M/S
ECHO TV REGURGITANT PEAK GRADIENT: 32 MMHG
ERYTHROCYTE [DISTWIDTH] IN BLOOD BY AUTOMATED COUNT: 14.2 % (ref 11.5–14.5)
EST. AVERAGE GLUCOSE BLD GHB EST-MCNC: 197 MG/DL
GLUCOSE BLD STRIP.AUTO-MCNC: 135 MG/DL (ref 65–117)
GLUCOSE BLD STRIP.AUTO-MCNC: 187 MG/DL (ref 65–117)
GLUCOSE BLD STRIP.AUTO-MCNC: 235 MG/DL (ref 65–117)
GLUCOSE SERPL-MCNC: 173 MG/DL (ref 65–100)
HBA1C MFR BLD: 8.5 % (ref 4–5.6)
HCT VFR BLD AUTO: 39.5 % (ref 36.6–50.3)
HGB BLD-MCNC: 13.2 G/DL (ref 12.1–17)
MCH RBC QN AUTO: 33.6 PG (ref 26–34)
MCHC RBC AUTO-ENTMCNC: 33.4 G/DL (ref 30–36.5)
MCV RBC AUTO: 100.5 FL (ref 80–99)
NRBC # BLD: 0 K/UL (ref 0–0.01)
NRBC BLD-RTO: 0 PER 100 WBC
PERFORMED BY, TECHID: ABNORMAL
PLATELET # BLD AUTO: 167 K/UL (ref 150–400)
PMV BLD AUTO: 10.1 FL (ref 8.9–12.9)
POTASSIUM SERPL-SCNC: 3.5 MMOL/L (ref 3.5–5.1)
RBC # BLD AUTO: 3.93 M/UL (ref 4.1–5.7)
SODIUM SERPL-SCNC: 137 MMOL/L (ref 136–145)
WBC # BLD AUTO: 9.3 K/UL (ref 4.1–11.1)

## 2022-08-05 PROCEDURE — 74011250637 HC RX REV CODE- 250/637: Performed by: INTERNAL MEDICINE

## 2022-08-05 PROCEDURE — 82962 GLUCOSE BLOOD TEST: CPT

## 2022-08-05 PROCEDURE — 94761 N-INVAS EAR/PLS OXIMETRY MLT: CPT

## 2022-08-05 PROCEDURE — 94660 CPAP INITIATION&MGMT: CPT

## 2022-08-05 PROCEDURE — 77010033678 HC OXYGEN DAILY

## 2022-08-05 PROCEDURE — 83036 HEMOGLOBIN GLYCOSYLATED A1C: CPT

## 2022-08-05 PROCEDURE — 74011250636 HC RX REV CODE- 250/636: Performed by: INTERNAL MEDICINE

## 2022-08-05 PROCEDURE — 93005 ELECTROCARDIOGRAM TRACING: CPT

## 2022-08-05 PROCEDURE — 74011250637 HC RX REV CODE- 250/637: Performed by: HOSPITALIST

## 2022-08-05 PROCEDURE — 80048 BASIC METABOLIC PNL TOTAL CA: CPT

## 2022-08-05 PROCEDURE — 96372 THER/PROPH/DIAG INJ SC/IM: CPT

## 2022-08-05 PROCEDURE — G0378 HOSPITAL OBSERVATION PER HR: HCPCS

## 2022-08-05 PROCEDURE — 36415 COLL VENOUS BLD VENIPUNCTURE: CPT

## 2022-08-05 PROCEDURE — 74011636637 HC RX REV CODE- 636/637: Performed by: HOSPITALIST

## 2022-08-05 PROCEDURE — 74011000250 HC RX REV CODE- 250: Performed by: HOSPITALIST

## 2022-08-05 PROCEDURE — 96376 TX/PRO/DX INJ SAME DRUG ADON: CPT

## 2022-08-05 PROCEDURE — 85027 COMPLETE CBC AUTOMATED: CPT

## 2022-08-05 RX ORDER — POTASSIUM CHLORIDE 1.5 G/1.77G
40 POWDER, FOR SOLUTION ORAL
Status: COMPLETED | OUTPATIENT
Start: 2022-08-05 | End: 2022-08-05

## 2022-08-05 RX ORDER — CARVEDILOL 12.5 MG/1
25 TABLET ORAL 2 TIMES DAILY WITH MEALS
Status: CANCELLED | OUTPATIENT
Start: 2022-08-05

## 2022-08-05 RX ADMIN — AMLODIPINE BESYLATE 10 MG: 5 TABLET ORAL at 10:01

## 2022-08-05 RX ADMIN — TRAMADOL HYDROCHLORIDE 50 MG: 50 TABLET, COATED ORAL at 13:26

## 2022-08-05 RX ADMIN — PAROXETINE HYDROCHLORIDE 40 MG: 20 TABLET, FILM COATED ORAL at 10:00

## 2022-08-05 RX ADMIN — Medication 1000 UNITS: at 10:01

## 2022-08-05 RX ADMIN — Medication 1 TABLET: at 18:17

## 2022-08-05 RX ADMIN — CITALOPRAM HYDROBROMIDE 40 MG: 20 TABLET ORAL at 10:01

## 2022-08-05 RX ADMIN — SODIUM CHLORIDE, PRESERVATIVE FREE 10 ML: 5 INJECTION INTRAVENOUS at 18:18

## 2022-08-05 RX ADMIN — ENOXAPARIN SODIUM 30 MG: 100 INJECTION SUBCUTANEOUS at 09:52

## 2022-08-05 RX ADMIN — SODIUM CHLORIDE, PRESERVATIVE FREE 10 ML: 5 INJECTION INTRAVENOUS at 05:27

## 2022-08-05 RX ADMIN — HYDROMORPHONE HYDROCHLORIDE 1 MG: 1 INJECTION, SOLUTION INTRAMUSCULAR; INTRAVENOUS; SUBCUTANEOUS at 00:44

## 2022-08-05 RX ADMIN — ASPIRIN 81 MG CHEWABLE TABLET 81 MG: 81 TABLET CHEWABLE at 10:00

## 2022-08-05 RX ADMIN — Medication 1 TABLET: at 10:01

## 2022-08-05 RX ADMIN — DOCUSATE SODIUM 100 MG: 100 CAPSULE, LIQUID FILLED ORAL at 21:51

## 2022-08-05 RX ADMIN — HYDROMORPHONE HYDROCHLORIDE 1 MG: 1 INJECTION, SOLUTION INTRAMUSCULAR; INTRAVENOUS; SUBCUTANEOUS at 05:27

## 2022-08-05 RX ADMIN — HYDROMORPHONE HYDROCHLORIDE 1 MG: 1 INJECTION, SOLUTION INTRAMUSCULAR; INTRAVENOUS; SUBCUTANEOUS at 19:36

## 2022-08-05 RX ADMIN — INSULIN LISPRO 3 UNITS: 100 INJECTION, SOLUTION INTRAVENOUS; SUBCUTANEOUS at 18:17

## 2022-08-05 RX ADMIN — INSULIN GLARGINE 16 UNITS: 100 INJECTION, SOLUTION SUBCUTANEOUS at 21:51

## 2022-08-05 RX ADMIN — ENOXAPARIN SODIUM 30 MG: 100 INJECTION SUBCUTANEOUS at 21:51

## 2022-08-05 RX ADMIN — ZOLPIDEM TARTRATE 5 MG: 5 TABLET ORAL at 21:51

## 2022-08-05 RX ADMIN — CYANOCOBALAMIN TAB 1000 MCG 1000 MCG: 1000 TAB at 10:00

## 2022-08-05 RX ADMIN — INSULIN LISPRO 4 UNITS: 100 INJECTION, SOLUTION INTRAVENOUS; SUBCUTANEOUS at 13:02

## 2022-08-05 RX ADMIN — TRAMADOL HYDROCHLORIDE 50 MG: 50 TABLET, COATED ORAL at 22:04

## 2022-08-05 RX ADMIN — HYDROMORPHONE HYDROCHLORIDE 1 MG: 1 INJECTION, SOLUTION INTRAMUSCULAR; INTRAVENOUS; SUBCUTANEOUS at 15:06

## 2022-08-05 RX ADMIN — HYDROMORPHONE HYDROCHLORIDE 1 MG: 1 INJECTION, SOLUTION INTRAMUSCULAR; INTRAVENOUS; SUBCUTANEOUS at 09:52

## 2022-08-05 RX ADMIN — Medication 1 CAPSULE: at 10:01

## 2022-08-05 RX ADMIN — POTASSIUM CHLORIDE 40 MEQ: 1.5 POWDER, FOR SOLUTION ORAL at 13:02

## 2022-08-05 RX ADMIN — HYDROMORPHONE HYDROCHLORIDE 1 MG: 1 INJECTION, SOLUTION INTRAMUSCULAR; INTRAVENOUS; SUBCUTANEOUS at 23:59

## 2022-08-05 RX ADMIN — SODIUM CHLORIDE, PRESERVATIVE FREE 10 ML: 5 INJECTION INTRAVENOUS at 21:52

## 2022-08-05 RX ADMIN — RANOLAZINE 1000 MG: 500 TABLET, FILM COATED, EXTENDED RELEASE ORAL at 10:00

## 2022-08-05 RX ADMIN — ATORVASTATIN CALCIUM 80 MG: 40 TABLET, FILM COATED ORAL at 10:01

## 2022-08-05 RX ADMIN — DOCUSATE SODIUM 100 MG: 100 CAPSULE, LIQUID FILLED ORAL at 10:01

## 2022-08-05 RX ADMIN — RANOLAZINE 1000 MG: 500 TABLET, FILM COATED, EXTENDED RELEASE ORAL at 21:51
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
## 2022-08-05 NOTE — PROGRESS NOTES
Attempted to see pt for PT evaluation; however, pt sustained an acute L3 compression fracture, is in a lot of pain and awaiting ortho consult. Will hold PT evaluation for now until ortho sees patient. Thank you!

## 2022-08-05 NOTE — PROGRESS NOTES
OT evaluation order received and acknowledged. Attempted to see pt for OT evaluation, however per medical chart pt sustained an acute L3 compression fracture, is currently in a significant amount of pain and pending orthopedics consult. Will hold OT evaluation at this time and follow up at a later time as medically appropriate pending ortho consult andPOC. Thank you. Melolabial Interpolation Flap Text: A decision was made to reconstruct the defect utilizing an interpolation axial flap and a staged reconstruction.  A telfa template was made of the defect.  This telfa template was then used to outline the melolabial interpolation flap.  The donor area for the pedicle flap was then injected with anesthesia.  The flap was excised through the skin and subcutaneous tissue down to the layer of the underlying musculature.  The pedicle flap was carefully excised within this deep plane to maintain its blood supply.  The edges of the donor site were undermined.   The donor site was closed in a primary fashion.  The pedicle was then rotated into position and sutured.  Once the tube was sutured into place, adequate blood supply was confirmed with blanching and refill.  The pedicle was then wrapped with xeroform gauze and dressed appropriately with a telfa and gauze bandage to ensure continued blood supply and protect the attached pedicle.

## 2022-08-05 NOTE — PROGRESS NOTES
Denver REED from orthopedics notified that patient needs to be seen.  He states he will come see patient in a bit

## 2022-08-05 NOTE — PROGRESS NOTES
Progress Note      8/5/2022 2:38 PM  NAME: Karmen Sewell   MRN:  [id number]   Admit Diagnosis: Dizziness [R42]  Syncope [R55]      Subjective:   Patient is a still having pain in his back and upper chest area. Review of Systems:    Symptom Y/N Comments  Symptom Y/N Comments   Fever/Chills n   Chest Pain y    Poor Appetite    Edema     Cough    Abdominal Pain n    Sputum    Joint Pain     SOB/OSCAR n   Pruritis/Rash     Nausea/vomit    Back pain y    Diarrhea         Constipation           Could NOT obtain due to:      Objective:        Physical Exam:    Last 24hrs VS reviewed since prior progress note. Most recent are:    Visit Vitals  /68   Pulse 70   Temp 98.5 °F (36.9 °C)   Resp 18   Ht 5' 8\" (1.727 m)   SpO2 90%   BMI 44.09 kg/m²       Intake/Output Summary (Last 24 hours) at 8/5/2022 1438  Last data filed at 8/5/2022 0316  Gross per 24 hour   Intake --   Output 250 ml   Net -250 ml        General Appearance: Well developed, well nourished, alert & oriented x 3,    no acute distress. Ears/Nose/Mouth/Throat: Hearing grossly normal.  Neck: Supple. Chest: Lungs clear to auscultation bilaterally. Cardiovascular: Regular rate and rhythm, S1,S2 normal, no murmur. Abdomen: Soft, non-tender, bowel sounds are active. Extremities: No edema bilaterally. Skin: Warm and dry. []         Post-cath site without hematoma, bruit, tenderness, or thrill. Distal pulses intact. PMH/SH reviewed - no change compared to H&P    Data Review    Telemetry: normal sinus rhythm     EKG:   []  No new EKG for review    Lab Data Personally Reviewed:    Recent Labs     08/05/22 0625 08/04/22 2203   WBC 9.3 10.9   HGB 13.2 12.9   HCT 39.5 37.6    175     No results for input(s): INR, PTP, APTT, INREXT in the last 72 hours. Recent Labs     08/05/22 0625 08/04/22 2203 08/03/22 2119    137 139   K 3.5 3.6 3.5    106 106   CO2 25 25 21   BUN 15 16 30*   CREA 1.05 0.98 2. 15*   * 174* 157* CA 8.9 9.0 8.8     No results for input(s): CPK, CKNDX, TROIQ in the last 72 hours. No lab exists for component: CPKMB  No results found for: CHOL, CHOLX, CHLST, CHOLV, HDL, HDLP, LDL, LDLC, DLDLP, TGLX, TRIGL, TRIGP, CHHD, CHHDX    Recent Labs     08/03/22  2119   AP 68   TP 7.0   ALB 3.6   GLOB 3.4   LPSE 71*     No results for input(s): PH, PCO2, PO2 in the last 72 hours.     Medications Personally Reviewed:    Current Facility-Administered Medications   Medication Dose Route Frequency    atorvastatin (LIPITOR) tablet 80 mg  80 mg Oral DAILY    citalopram (CELEXA) tablet 40 mg  40 mg Oral DAILY    PARoxetine (PAXIL) tablet 40 mg  40 mg Oral DAILY    zolpidem (AMBIEN) tablet 5 mg  5 mg Oral QHS PRN    aspirin chewable tablet 81 mg  81 mg Oral DAILY    cholecalciferol (VITAMIN D3) (1000 Units /25 mcg) tablet 1,000 Units  1,000 Units Oral DAILY    cyanocobalamin tablet 1,000 mcg  1,000 mcg Oral DAILY    vitamin B complex capsule 1 Capsule  1 Capsule Oral DAILY    fluticasone propionate (FLONASE) 50 mcg/actuation nasal spray 2 Spray  2 Spray Both Nostrils DAILY    albuterol (PROVENTIL HFA, VENTOLIN HFA, PROAIR HFA) inhaler 2 Puff  2 Puff Inhalation Q4H PRN    amLODIPine (NORVASC) tablet 10 mg  10 mg Oral DAILY    insulin glargine (LANTUS) injection 16 Units  16 Units SubCUTAneous QHS    ranolazine ER (RANEXA) tablet 1,000 mg  1,000 mg Oral BID    calcium-vitamin D 600 mg-5 mcg (200 unit) per tablet 1 Tablet  1 Tablet Oral BID WITH MEALS    sodium chloride (NS) flush 5-40 mL  5-40 mL IntraVENous Q8H    sodium chloride (NS) flush 5-40 mL  5-40 mL IntraVENous PRN    acetaminophen (TYLENOL) tablet 650 mg  650 mg Oral Q6H PRN    docusate sodium (COLACE) capsule 100 mg  100 mg Oral BID    insulin lispro (HUMALOG) injection   SubCUTAneous AC&HS    glucose chewable tablet 16 g  4 Tablet Oral PRN    glucagon (GLUCAGEN) injection 1 mg  1 mg IntraMUSCular PRN    dextrose 10% infusion 0-250 mL  0-250 mL IntraVENous PRN traMADoL (ULTRAM) tablet 50 mg  50 mg Oral Q6H PRN    enoxaparin (LOVENOX) injection 30 mg  30 mg SubCUTAneous Q12H    polyethylene glycol (MIRALAX) packet 17 g  17 g Oral DAILY PRN    HYDROmorphone (DILAUDID) injection 1 mg  1 mg IntraVENous Q4H PRN           Problem List:   Echocardiograph results explained and patient has a dilated left ventricle with severe left ventricular systolic dysfunction. Probably syncope related to alcohol intoxication. S/p ICD. Diabetes mellitus type 2. Fracture of the spine. Assessment/Plan:   From cardiac viewpoint I will continue present care. I we will follow pulmonary and orthopedic recommendations. 2.           []       High complexity decision making was performed in this patient at high risk for decompensation with multiple organ involvement.     Herminia Serrano MD
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
## 2022-08-05 NOTE — PROGRESS NOTES
CM reviewed chart and spoke with attending physician. Patient's discharge disposition is home self. CM will continue to follow.
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
## 2022-08-06 LAB
ANION GAP SERPL CALC-SCNC: 6 MMOL/L (ref 5–15)
ATRIAL RATE: 73 BPM
BUN SERPL-MCNC: 16 MG/DL (ref 6–20)
BUN/CREAT SERPL: 14 (ref 12–20)
CA-I BLD-MCNC: 9.7 MG/DL (ref 8.5–10.1)
CALCULATED P AXIS, ECG09: 67 DEGREES
CALCULATED R AXIS, ECG10: -31 DEGREES
CALCULATED T AXIS, ECG11: 95 DEGREES
CHLORIDE SERPL-SCNC: 102 MMOL/L (ref 97–108)
CO2 SERPL-SCNC: 27 MMOL/L (ref 21–32)
CREAT SERPL-MCNC: 1.11 MG/DL (ref 0.7–1.3)
DIAGNOSIS, 93000: NORMAL
ERYTHROCYTE [DISTWIDTH] IN BLOOD BY AUTOMATED COUNT: 13.8 % (ref 11.5–14.5)
GLUCOSE BLD STRIP.AUTO-MCNC: 161 MG/DL (ref 65–117)
GLUCOSE BLD STRIP.AUTO-MCNC: 165 MG/DL (ref 65–117)
GLUCOSE BLD STRIP.AUTO-MCNC: 172 MG/DL (ref 65–117)
GLUCOSE BLD STRIP.AUTO-MCNC: 194 MG/DL (ref 65–117)
GLUCOSE SERPL-MCNC: 160 MG/DL (ref 65–100)
HCT VFR BLD AUTO: 39.5 % (ref 36.6–50.3)
HGB BLD-MCNC: 13 G/DL (ref 12.1–17)
MCH RBC QN AUTO: 32.7 PG (ref 26–34)
MCHC RBC AUTO-ENTMCNC: 32.9 G/DL (ref 30–36.5)
MCV RBC AUTO: 99.2 FL (ref 80–99)
NRBC # BLD: 0 K/UL (ref 0–0.01)
NRBC BLD-RTO: 0 PER 100 WBC
P-R INTERVAL, ECG05: 208 MS
PERFORMED BY, TECHID: ABNORMAL
PLATELET # BLD AUTO: 168 K/UL (ref 150–400)
PMV BLD AUTO: 9.8 FL (ref 8.9–12.9)
POTASSIUM SERPL-SCNC: 4 MMOL/L (ref 3.5–5.1)
Q-T INTERVAL, ECG07: 432 MS
QRS DURATION, ECG06: 140 MS
QTC CALCULATION (BEZET), ECG08: 475 MS
RBC # BLD AUTO: 3.98 M/UL (ref 4.1–5.7)
SODIUM SERPL-SCNC: 135 MMOL/L (ref 136–145)
VENTRICULAR RATE, ECG03: 73 BPM
WBC # BLD AUTO: 8.9 K/UL (ref 4.1–11.1)

## 2022-08-06 PROCEDURE — 97530 THERAPEUTIC ACTIVITIES: CPT

## 2022-08-06 PROCEDURE — 82962 GLUCOSE BLOOD TEST: CPT

## 2022-08-06 PROCEDURE — 74011250637 HC RX REV CODE- 250/637: Performed by: HOSPITALIST

## 2022-08-06 PROCEDURE — 85027 COMPLETE CBC AUTOMATED: CPT

## 2022-08-06 PROCEDURE — 93005 ELECTROCARDIOGRAM TRACING: CPT

## 2022-08-06 PROCEDURE — 80048 BASIC METABOLIC PNL TOTAL CA: CPT

## 2022-08-06 PROCEDURE — 96376 TX/PRO/DX INJ SAME DRUG ADON: CPT

## 2022-08-06 PROCEDURE — 97165 OT EVAL LOW COMPLEX 30 MIN: CPT

## 2022-08-06 PROCEDURE — 36415 COLL VENOUS BLD VENIPUNCTURE: CPT

## 2022-08-06 PROCEDURE — 74011000250 HC RX REV CODE- 250: Performed by: HOSPITALIST

## 2022-08-06 PROCEDURE — 96372 THER/PROPH/DIAG INJ SC/IM: CPT

## 2022-08-06 PROCEDURE — G0378 HOSPITAL OBSERVATION PER HR: HCPCS

## 2022-08-06 PROCEDURE — 74011636637 HC RX REV CODE- 636/637: Performed by: HOSPITALIST

## 2022-08-06 PROCEDURE — 74011250636 HC RX REV CODE- 250/636: Performed by: INTERNAL MEDICINE

## 2022-08-06 PROCEDURE — 97161 PT EVAL LOW COMPLEX 20 MIN: CPT

## 2022-08-06 PROCEDURE — 94660 CPAP INITIATION&MGMT: CPT

## 2022-08-06 PROCEDURE — 94762 N-INVAS EAR/PLS OXIMTRY CONT: CPT

## 2022-08-06 RX ADMIN — AMLODIPINE BESYLATE 10 MG: 5 TABLET ORAL at 09:12

## 2022-08-06 RX ADMIN — RANOLAZINE 1000 MG: 500 TABLET, FILM COATED, EXTENDED RELEASE ORAL at 22:09

## 2022-08-06 RX ADMIN — SODIUM CHLORIDE, PRESERVATIVE FREE 10 ML: 5 INJECTION INTRAVENOUS at 17:16

## 2022-08-06 RX ADMIN — INSULIN LISPRO 3 UNITS: 100 INJECTION, SOLUTION INTRAVENOUS; SUBCUTANEOUS at 12:21

## 2022-08-06 RX ADMIN — SODIUM CHLORIDE, PRESERVATIVE FREE 10 ML: 5 INJECTION INTRAVENOUS at 05:12

## 2022-08-06 RX ADMIN — ASPIRIN 81 MG CHEWABLE TABLET 81 MG: 81 TABLET CHEWABLE at 09:12

## 2022-08-06 RX ADMIN — Medication 1000 UNITS: at 09:13

## 2022-08-06 RX ADMIN — TRAMADOL HYDROCHLORIDE 50 MG: 50 TABLET, COATED ORAL at 18:34

## 2022-08-06 RX ADMIN — HYDROMORPHONE HYDROCHLORIDE 1 MG: 1 INJECTION, SOLUTION INTRAMUSCULAR; INTRAVENOUS; SUBCUTANEOUS at 14:33

## 2022-08-06 RX ADMIN — DOCUSATE SODIUM 100 MG: 100 CAPSULE, LIQUID FILLED ORAL at 09:13

## 2022-08-06 RX ADMIN — PAROXETINE HYDROCHLORIDE 40 MG: 20 TABLET, FILM COATED ORAL at 09:12

## 2022-08-06 RX ADMIN — ENOXAPARIN SODIUM 30 MG: 100 INJECTION SUBCUTANEOUS at 09:13

## 2022-08-06 RX ADMIN — Medication 1 TABLET: at 17:16

## 2022-08-06 RX ADMIN — INSULIN LISPRO 3 UNITS: 100 INJECTION, SOLUTION INTRAVENOUS; SUBCUTANEOUS at 09:13

## 2022-08-06 RX ADMIN — Medication 1 CAPSULE: at 09:13

## 2022-08-06 RX ADMIN — ZOLPIDEM TARTRATE 5 MG: 5 TABLET ORAL at 22:09

## 2022-08-06 RX ADMIN — ENOXAPARIN SODIUM 30 MG: 100 INJECTION SUBCUTANEOUS at 22:09

## 2022-08-06 RX ADMIN — ATORVASTATIN CALCIUM 80 MG: 40 TABLET, FILM COATED ORAL at 09:13

## 2022-08-06 RX ADMIN — CYANOCOBALAMIN TAB 1000 MCG 1000 MCG: 1000 TAB at 09:13

## 2022-08-06 RX ADMIN — RANOLAZINE 1000 MG: 500 TABLET, FILM COATED, EXTENDED RELEASE ORAL at 09:12

## 2022-08-06 RX ADMIN — HYDROMORPHONE HYDROCHLORIDE 1 MG: 1 INJECTION, SOLUTION INTRAMUSCULAR; INTRAVENOUS; SUBCUTANEOUS at 09:19

## 2022-08-06 RX ADMIN — TRAMADOL HYDROCHLORIDE 50 MG: 50 TABLET, COATED ORAL at 12:26

## 2022-08-06 RX ADMIN — DOCUSATE SODIUM 100 MG: 100 CAPSULE, LIQUID FILLED ORAL at 22:09

## 2022-08-06 RX ADMIN — SODIUM CHLORIDE, PRESERVATIVE FREE 10 ML: 5 INJECTION INTRAVENOUS at 22:09

## 2022-08-06 RX ADMIN — HYDROMORPHONE HYDROCHLORIDE 1 MG: 1 INJECTION, SOLUTION INTRAMUSCULAR; INTRAVENOUS; SUBCUTANEOUS at 05:12

## 2022-08-06 RX ADMIN — INSULIN LISPRO 3 UNITS: 100 INJECTION, SOLUTION INTRAVENOUS; SUBCUTANEOUS at 17:16

## 2022-08-06 RX ADMIN — Medication 1 TABLET: at 09:13

## 2022-08-06 RX ADMIN — HYDROMORPHONE HYDROCHLORIDE 1 MG: 1 INJECTION, SOLUTION INTRAMUSCULAR; INTRAVENOUS; SUBCUTANEOUS at 21:20

## 2022-08-06 RX ADMIN — CITALOPRAM HYDROBROMIDE 40 MG: 20 TABLET ORAL at 09:12

## 2022-08-06 RX ADMIN — INSULIN GLARGINE 16 UNITS: 100 INJECTION, SOLUTION SUBCUTANEOUS at 22:09

## 2022-08-06 NOTE — PROGRESS NOTES
Progress Note      8/6/2022 2:38 PM  NAME: Stefania Lim   MRN:  [id number]   Admit Diagnosis: Dizziness [R42]  Syncope [R55]      Subjective:   Patient is a still having pain in his back and upper chest area. Patient is in the chair and is somewhat more comfortable. Review of Systems:    Symptom Y/N Comments  Symptom Y/N Comments   Fever/Chills n   Chest Pain y    Poor Appetite    Edema     Cough    Abdominal Pain n    Sputum    Joint Pain     SOB/OSCAR n   Pruritis/Rash     Nausea/vomit    Back pain y    Diarrhea         Constipation           Could NOT obtain due to:      Objective:        Physical Exam:    Last 24hrs VS reviewed since prior progress note. Most recent are:    Visit Vitals  /79   Pulse 84   Temp 98.4 °F (36.9 °C)   Resp 20   Ht 5' 8\" (1.727 m)   SpO2 90%   BMI 44.09 kg/m²       Intake/Output Summary (Last 24 hours) at 8/6/2022 1145  Last data filed at 8/5/2022 2011  Gross per 24 hour   Intake 1500 ml   Output --   Net 1500 ml          General Appearance: Well developed, well nourished, alert & oriented x 3,    no acute distress. Ears/Nose/Mouth/Throat: Hearing grossly normal.  Neck: Supple. Chest: Lungs clear to auscultation bilaterally. Cardiovascular: Regular rate and rhythm, S1,S2 normal, no murmur. Abdomen: Soft, non-tender, bowel sounds are active. Extremities: No edema bilaterally. Skin: Warm and dry. []         Post-cath site without hematoma, bruit, tenderness, or thrill. Distal pulses intact. PMH/SH reviewed - no change compared to H&P    Data Review    Telemetry: normal sinus rhythm     EKG:   []  No new EKG for review    Lab Data Personally Reviewed:    Recent Labs     08/06/22  0913 08/05/22  0625   WBC 8.9 9.3   HGB 13.0 13.2   HCT 39.5 39.5    167       No results for input(s): INR, PTP, APTT, INREXT, INREXT in the last 72 hours.    Recent Labs     08/06/22  0913 08/05/22  0625 08/04/22  2203   * 137 137   K 4.0 3.5 3.6    105 106   CO2 27 25 25   BUN 16 15 16   CREA 1.11 1.05 0.98   * 173* 174*   CA 9.7 8.9 9.0       No results for input(s): CPK, CKNDX, TROIQ in the last 72 hours. No lab exists for component: CPKMB  No results found for: CHOL, CHOLX, CHLST, CHOLV, HDL, HDLP, LDL, LDLC, DLDLP, TGLX, TRIGL, TRIGP, CHHD, CHHDX    Recent Labs     08/03/22  2119   AP 68   TP 7.0   ALB 3.6   GLOB 3.4   LPSE 71*       No results for input(s): PH, PCO2, PO2 in the last 72 hours.     Medications Personally Reviewed:    Current Facility-Administered Medications   Medication Dose Route Frequency    atorvastatin (LIPITOR) tablet 80 mg  80 mg Oral DAILY    citalopram (CELEXA) tablet 40 mg  40 mg Oral DAILY    PARoxetine (PAXIL) tablet 40 mg  40 mg Oral DAILY    zolpidem (AMBIEN) tablet 5 mg  5 mg Oral QHS PRN    aspirin chewable tablet 81 mg  81 mg Oral DAILY    cholecalciferol (VITAMIN D3) (1000 Units /25 mcg) tablet 1,000 Units  1,000 Units Oral DAILY    cyanocobalamin tablet 1,000 mcg  1,000 mcg Oral DAILY    vitamin B complex capsule 1 Capsule  1 Capsule Oral DAILY    fluticasone propionate (FLONASE) 50 mcg/actuation nasal spray 2 Spray  2 Spray Both Nostrils DAILY    albuterol (PROVENTIL HFA, VENTOLIN HFA, PROAIR HFA) inhaler 2 Puff  2 Puff Inhalation Q4H PRN    amLODIPine (NORVASC) tablet 10 mg  10 mg Oral DAILY    insulin glargine (LANTUS) injection 16 Units  16 Units SubCUTAneous QHS    ranolazine ER (RANEXA) tablet 1,000 mg  1,000 mg Oral BID    calcium-vitamin D 600 mg-5 mcg (200 unit) per tablet 1 Tablet  1 Tablet Oral BID WITH MEALS    sodium chloride (NS) flush 5-40 mL  5-40 mL IntraVENous Q8H    sodium chloride (NS) flush 5-40 mL  5-40 mL IntraVENous PRN    acetaminophen (TYLENOL) tablet 650 mg  650 mg Oral Q6H PRN    docusate sodium (COLACE) capsule 100 mg  100 mg Oral BID    insulin lispro (HUMALOG) injection   SubCUTAneous AC&HS    glucose chewable tablet 16 g  4 Tablet Oral PRN    glucagon (GLUCAGEN) injection 1 mg  1 mg IntraMUSCular PRN    dextrose 10% infusion 0-250 mL  0-250 mL IntraVENous PRN    traMADoL (ULTRAM) tablet 50 mg  50 mg Oral Q6H PRN    enoxaparin (LOVENOX) injection 30 mg  30 mg SubCUTAneous Q12H    polyethylene glycol (MIRALAX) packet 17 g  17 g Oral DAILY PRN    HYDROmorphone (DILAUDID) injection 1 mg  1 mg IntraVENous Q4H PRN             Problem List:   Echocardiograph results explained and patient has a dilated left ventricle with severe left ventricular systolic dysfunction. Probably syncope related to alcohol intoxication. S/p ICD. Diabetes mellitus type 2. Fracture of the spine. Assessment/Plan:   From cardiac viewpoint I will continue present care. I we will follow pulmonary and orthopedic recommendations. 2.           []       High complexity decision making was performed in this patient at high risk for decompensation with multiple organ involvement.     Kendrick Romero MD

## 2022-08-06 NOTE — PROGRESS NOTES
Patient's daughter would like to be called about discharge plans. Patient agreed to have  daughter speak on his behalf regarding plan of care. Daughter name is Rosi [phone]. Please call.
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
## 2022-08-07 LAB
ANION GAP SERPL CALC-SCNC: 9 MMOL/L (ref 5–15)
ATRIAL RATE: 72 BPM
BUN SERPL-MCNC: 19 MG/DL (ref 6–20)
BUN/CREAT SERPL: 18 (ref 12–20)
CA-I BLD-MCNC: 9.3 MG/DL (ref 8.5–10.1)
CALCULATED P AXIS, ECG09: 66 DEGREES
CALCULATED R AXIS, ECG10: -29 DEGREES
CALCULATED T AXIS, ECG11: 98 DEGREES
CHLORIDE SERPL-SCNC: 98 MMOL/L (ref 97–108)
CO2 SERPL-SCNC: 26 MMOL/L (ref 21–32)
CREAT SERPL-MCNC: 1.03 MG/DL (ref 0.7–1.3)
DIAGNOSIS, 93000: NORMAL
ERYTHROCYTE [DISTWIDTH] IN BLOOD BY AUTOMATED COUNT: 13.6 % (ref 11.5–14.5)
GLUCOSE BLD STRIP.AUTO-MCNC: 144 MG/DL (ref 65–117)
GLUCOSE BLD STRIP.AUTO-MCNC: 206 MG/DL (ref 65–117)
GLUCOSE BLD STRIP.AUTO-MCNC: 218 MG/DL (ref 65–117)
GLUCOSE BLD STRIP.AUTO-MCNC: 222 MG/DL (ref 65–117)
GLUCOSE SERPL-MCNC: 140 MG/DL (ref 65–100)
HCT VFR BLD AUTO: 37.1 % (ref 36.6–50.3)
HGB BLD-MCNC: 12.6 G/DL (ref 12.1–17)
MCH RBC QN AUTO: 33.6 PG (ref 26–34)
MCHC RBC AUTO-ENTMCNC: 34 G/DL (ref 30–36.5)
MCV RBC AUTO: 98.9 FL (ref 80–99)
NRBC # BLD: 0 K/UL (ref 0–0.01)
NRBC BLD-RTO: 0 PER 100 WBC
P-R INTERVAL, ECG05: 184 MS
PERFORMED BY, TECHID: ABNORMAL
PLATELET # BLD AUTO: 183 K/UL (ref 150–400)
PMV BLD AUTO: 10 FL (ref 8.9–12.9)
POTASSIUM SERPL-SCNC: 3.6 MMOL/L (ref 3.5–5.1)
Q-T INTERVAL, ECG07: 446 MS
QRS DURATION, ECG06: 146 MS
QTC CALCULATION (BEZET), ECG08: 488 MS
RBC # BLD AUTO: 3.75 M/UL (ref 4.1–5.7)
SODIUM SERPL-SCNC: 133 MMOL/L (ref 136–145)
VENTRICULAR RATE, ECG03: 72 BPM
WBC # BLD AUTO: 8.5 K/UL (ref 4.1–11.1)

## 2022-08-07 PROCEDURE — 74011000250 HC RX REV CODE- 250: Performed by: HOSPITALIST

## 2022-08-07 PROCEDURE — 74011250637 HC RX REV CODE- 250/637: Performed by: HOSPITALIST

## 2022-08-07 PROCEDURE — 80048 BASIC METABOLIC PNL TOTAL CA: CPT

## 2022-08-07 PROCEDURE — 36415 COLL VENOUS BLD VENIPUNCTURE: CPT

## 2022-08-07 PROCEDURE — G0378 HOSPITAL OBSERVATION PER HR: HCPCS

## 2022-08-07 PROCEDURE — 94660 CPAP INITIATION&MGMT: CPT

## 2022-08-07 PROCEDURE — 74011636637 HC RX REV CODE- 636/637: Performed by: HOSPITALIST

## 2022-08-07 PROCEDURE — 96376 TX/PRO/DX INJ SAME DRUG ADON: CPT

## 2022-08-07 PROCEDURE — 85027 COMPLETE CBC AUTOMATED: CPT

## 2022-08-07 PROCEDURE — 74011250636 HC RX REV CODE- 250/636: Performed by: INTERNAL MEDICINE

## 2022-08-07 PROCEDURE — 82962 GLUCOSE BLOOD TEST: CPT

## 2022-08-07 PROCEDURE — 96372 THER/PROPH/DIAG INJ SC/IM: CPT

## 2022-08-07 PROCEDURE — 74011250637 HC RX REV CODE- 250/637: Performed by: INTERNAL MEDICINE

## 2022-08-07 RX ORDER — POTASSIUM CHLORIDE 1.5 G/1.77G
40 POWDER, FOR SOLUTION ORAL
Status: COMPLETED | OUTPATIENT
Start: 2022-08-07 | End: 2022-08-07

## 2022-08-07 RX ORDER — MORPHINE SULFATE 2 MG/ML
1 INJECTION, SOLUTION INTRAMUSCULAR; INTRAVENOUS
Status: COMPLETED | OUTPATIENT
Start: 2022-08-07 | End: 2022-08-08

## 2022-08-07 RX ADMIN — AMLODIPINE BESYLATE 10 MG: 5 TABLET ORAL at 10:05

## 2022-08-07 RX ADMIN — INSULIN LISPRO 2 UNITS: 100 INJECTION, SOLUTION INTRAVENOUS; SUBCUTANEOUS at 21:25

## 2022-08-07 RX ADMIN — CYANOCOBALAMIN TAB 1000 MCG 1000 MCG: 1000 TAB at 10:05

## 2022-08-07 RX ADMIN — INSULIN LISPRO 4 UNITS: 100 INJECTION, SOLUTION INTRAVENOUS; SUBCUTANEOUS at 17:32

## 2022-08-07 RX ADMIN — PAROXETINE HYDROCHLORIDE 40 MG: 20 TABLET, FILM COATED ORAL at 10:05

## 2022-08-07 RX ADMIN — SODIUM CHLORIDE, PRESERVATIVE FREE 10 ML: 5 INJECTION INTRAVENOUS at 05:32

## 2022-08-07 RX ADMIN — INSULIN LISPRO 4 UNITS: 100 INJECTION, SOLUTION INTRAVENOUS; SUBCUTANEOUS at 13:00

## 2022-08-07 RX ADMIN — TRAMADOL HYDROCHLORIDE 50 MG: 50 TABLET, COATED ORAL at 16:50

## 2022-08-07 RX ADMIN — ENOXAPARIN SODIUM 30 MG: 100 INJECTION SUBCUTANEOUS at 10:05

## 2022-08-07 RX ADMIN — HYDROMORPHONE HYDROCHLORIDE 1 MG: 1 INJECTION, SOLUTION INTRAMUSCULAR; INTRAVENOUS; SUBCUTANEOUS at 05:32

## 2022-08-07 RX ADMIN — ASPIRIN 81 MG CHEWABLE TABLET 81 MG: 81 TABLET CHEWABLE at 10:04

## 2022-08-07 RX ADMIN — SODIUM CHLORIDE, PRESERVATIVE FREE 10 ML: 5 INJECTION INTRAVENOUS at 21:28

## 2022-08-07 RX ADMIN — RANOLAZINE 1000 MG: 500 TABLET, FILM COATED, EXTENDED RELEASE ORAL at 21:25

## 2022-08-07 RX ADMIN — Medication 1 CAPSULE: at 10:05

## 2022-08-07 RX ADMIN — DOCUSATE SODIUM 100 MG: 100 CAPSULE, LIQUID FILLED ORAL at 21:25

## 2022-08-07 RX ADMIN — INSULIN LISPRO 3 UNITS: 100 INJECTION, SOLUTION INTRAVENOUS; SUBCUTANEOUS at 10:05

## 2022-08-07 RX ADMIN — Medication 1000 UNITS: at 10:05

## 2022-08-07 RX ADMIN — SODIUM CHLORIDE, PRESERVATIVE FREE 10 ML: 5 INJECTION INTRAVENOUS at 13:00

## 2022-08-07 RX ADMIN — CITALOPRAM HYDROBROMIDE 40 MG: 20 TABLET ORAL at 10:05

## 2022-08-07 RX ADMIN — RANOLAZINE 1000 MG: 500 TABLET, FILM COATED, EXTENDED RELEASE ORAL at 10:04

## 2022-08-07 RX ADMIN — Medication 1 TABLET: at 10:09

## 2022-08-07 RX ADMIN — ATORVASTATIN CALCIUM 80 MG: 40 TABLET, FILM COATED ORAL at 10:05

## 2022-08-07 RX ADMIN — INSULIN GLARGINE 16 UNITS: 100 INJECTION, SOLUTION SUBCUTANEOUS at 21:25

## 2022-08-07 RX ADMIN — HYDROMORPHONE HYDROCHLORIDE 1 MG: 1 INJECTION, SOLUTION INTRAMUSCULAR; INTRAVENOUS; SUBCUTANEOUS at 10:13

## 2022-08-07 RX ADMIN — MORPHINE SULFATE 1 MG: 2 INJECTION, SOLUTION INTRAMUSCULAR; INTRAVENOUS at 21:26

## 2022-08-07 RX ADMIN — Medication 1 TABLET: at 16:50

## 2022-08-07 RX ADMIN — POTASSIUM CHLORIDE 40 MEQ: 1.5 POWDER, FOR SOLUTION ORAL at 16:50

## 2022-08-07 RX ADMIN — ENOXAPARIN SODIUM 30 MG: 100 INJECTION SUBCUTANEOUS at 21:26

## 2022-08-07 RX ADMIN — DOCUSATE SODIUM 100 MG: 100 CAPSULE, LIQUID FILLED ORAL at 10:05

## 2022-08-07 NOTE — PROGRESS NOTES
Progress Note      8/7/2022 2:38 PM  NAME: Petey Colmenares   MRN:  [id number]   Admit Diagnosis: Dizziness [R42]  Syncope [R55]      Subjective:   Patient is a still having pain in his back and upper chest area. Patient is in the chair and is somewhat more comfortable. Review of Systems:    Symptom Y/N Comments  Symptom Y/N Comments   Fever/Chills n   Chest Pain y    Poor Appetite    Edema     Cough    Abdominal Pain n    Sputum    Joint Pain     SOB/OSCAR n   Pruritis/Rash     Nausea/vomit    Back pain y    Diarrhea         Constipation           Could NOT obtain due to:      Objective:        Physical Exam:    Last 24hrs VS reviewed since prior progress note. Most recent are:    Visit Vitals  /80   Pulse 68   Temp 99.7 °F (37.6 °C)   Resp 24   Ht 5' 8\" (1.727 m)   SpO2 93%   BMI 44.09 kg/m²     No intake or output data in the 24 hours ending 08/07/22 1122       General Appearance: Well developed, well nourished, alert & oriented x 3,    no acute distress. Ears/Nose/Mouth/Throat: Hearing grossly normal.  Neck: Supple. Chest: Lungs clear to auscultation bilaterally. Cardiovascular: Regular rate and rhythm, S1,S2 normal, no murmur. Abdomen: Soft, non-tender, bowel sounds are active. Extremities: No edema bilaterally. Skin: Warm and dry. []         Post-cath site without hematoma, bruit, tenderness, or thrill. Distal pulses intact. PMH/SH reviewed - no change compared to H&P    Data Review    Telemetry: normal sinus rhythm     EKG:   []  No new EKG for review    Lab Data Personally Reviewed:    Recent Labs     08/07/22  0917 08/06/22  0913   WBC 8.5 8.9   HGB 12.6 13.0   HCT 37.1 39.5    168       No results for input(s): INR, PTP, APTT, INREXT, INREXT in the last 72 hours.    Recent Labs     08/06/22  0913 08/05/22  0625 08/04/22  2203   * 137 137   K 4.0 3.5 3.6    105 106   CO2 27 25 25   BUN 16 15 16   CREA 1.11 1.05 0.98   * 173* 174*   CA 9.7 8.9 9.0       No results for input(s): CPK, CKNDX, TROIQ in the last 72 hours. No lab exists for component: CPKMB  No results found for: CHOL, CHOLX, CHLST, CHOLV, HDL, HDLP, LDL, LDLC, DLDLP, TGLX, TRIGL, TRIGP, CHHD, CHHDX    No results for input(s): AP, TBIL, TP, ALB, GLOB, GGT, AML, LPSE in the last 72 hours. No lab exists for component: SGOT, GPT, AMYP, HLPSE    No results for input(s): PH, PCO2, PO2 in the last 72 hours.     Medications Personally Reviewed:    Current Facility-Administered Medications   Medication Dose Route Frequency    atorvastatin (LIPITOR) tablet 80 mg  80 mg Oral DAILY    citalopram (CELEXA) tablet 40 mg  40 mg Oral DAILY    PARoxetine (PAXIL) tablet 40 mg  40 mg Oral DAILY    zolpidem (AMBIEN) tablet 5 mg  5 mg Oral QHS PRN    aspirin chewable tablet 81 mg  81 mg Oral DAILY    cholecalciferol (VITAMIN D3) (1000 Units /25 mcg) tablet 1,000 Units  1,000 Units Oral DAILY    cyanocobalamin tablet 1,000 mcg  1,000 mcg Oral DAILY    vitamin B complex capsule 1 Capsule  1 Capsule Oral DAILY    fluticasone propionate (FLONASE) 50 mcg/actuation nasal spray 2 Spray  2 Spray Both Nostrils DAILY    albuterol (PROVENTIL HFA, VENTOLIN HFA, PROAIR HFA) inhaler 2 Puff  2 Puff Inhalation Q4H PRN    amLODIPine (NORVASC) tablet 10 mg  10 mg Oral DAILY    insulin glargine (LANTUS) injection 16 Units  16 Units SubCUTAneous QHS    ranolazine ER (RANEXA) tablet 1,000 mg  1,000 mg Oral BID    calcium-vitamin D 600 mg-5 mcg (200 unit) per tablet 1 Tablet  1 Tablet Oral BID WITH MEALS    sodium chloride (NS) flush 5-40 mL  5-40 mL IntraVENous Q8H    sodium chloride (NS) flush 5-40 mL  5-40 mL IntraVENous PRN    acetaminophen (TYLENOL) tablet 650 mg  650 mg Oral Q6H PRN    docusate sodium (COLACE) capsule 100 mg  100 mg Oral BID    insulin lispro (HUMALOG) injection   SubCUTAneous AC&HS    glucose chewable tablet 16 g  4 Tablet Oral PRN    glucagon (GLUCAGEN) injection 1 mg  1 mg IntraMUSCular PRN    dextrose 10% infusion 0-250 mL  0-250 mL IntraVENous PRN    traMADoL (ULTRAM) tablet 50 mg  50 mg Oral Q6H PRN    enoxaparin (LOVENOX) injection 30 mg  30 mg SubCUTAneous Q12H    polyethylene glycol (MIRALAX) packet 17 g  17 g Oral DAILY PRN             Problem List:   Echocardiograph results explained and patient has a dilated left ventricle with severe left ventricular systolic dysfunction. Probably syncope related to alcohol intoxication. S/p ICD. Diabetes mellitus type 2. Fracture of the spine. Assessment/Plan:   From cardiac viewpoint I will continue present care. I we will follow pulmonary and orthopedic recommendations. 2.           []       High complexity decision making was performed in this patient at high risk for decompensation with multiple organ involvement.     Jason Soto MD
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
## 2022-08-07 NOTE — PROGRESS NOTES
Problem: Falls - Risk of  Goal: *Absence of Falls  Description: Document Jesse Rivera Fall Risk and appropriate interventions in the flowsheet.   Outcome: Progressing Towards Goal  Note: Fall Risk Interventions:  Mobility Interventions: Bed/chair exit alarm, Patient to call before getting OOB    Mentation Interventions: Adequate sleep, hydration, pain control, Bed/chair exit alarm    Medication Interventions: Bed/chair exit alarm, Patient to call before getting OOB, Teach patient to arise slowly    Elimination Interventions: Bed/chair exit alarm, Call light in reach

## 2022-08-08 LAB
ANION GAP SERPL CALC-SCNC: 7 MMOL/L (ref 5–15)
BUN SERPL-MCNC: 17 MG/DL (ref 6–20)
BUN/CREAT SERPL: 17 (ref 12–20)
CA-I BLD-MCNC: 8.7 MG/DL (ref 8.5–10.1)
CHLORIDE SERPL-SCNC: 101 MMOL/L (ref 97–108)
CO2 SERPL-SCNC: 26 MMOL/L (ref 21–32)
CREAT SERPL-MCNC: 1 MG/DL (ref 0.7–1.3)
ERYTHROCYTE [DISTWIDTH] IN BLOOD BY AUTOMATED COUNT: 13.4 % (ref 11.5–14.5)
GLUCOSE BLD STRIP.AUTO-MCNC: 145 MG/DL (ref 65–117)
GLUCOSE BLD STRIP.AUTO-MCNC: 153 MG/DL (ref 65–117)
GLUCOSE BLD STRIP.AUTO-MCNC: 161 MG/DL (ref 65–117)
GLUCOSE BLD STRIP.AUTO-MCNC: 172 MG/DL (ref 65–117)
GLUCOSE SERPL-MCNC: 148 MG/DL (ref 65–100)
HCT VFR BLD AUTO: 35 % (ref 36.6–50.3)
HGB BLD-MCNC: 11.9 G/DL (ref 12.1–17)
MCH RBC QN AUTO: 33.4 PG (ref 26–34)
MCHC RBC AUTO-ENTMCNC: 34 G/DL (ref 30–36.5)
MCV RBC AUTO: 98.3 FL (ref 80–99)
NRBC # BLD: 0 K/UL (ref 0–0.01)
NRBC BLD-RTO: 0 PER 100 WBC
PERFORMED BY, TECHID: ABNORMAL
PLATELET # BLD AUTO: 174 K/UL (ref 150–400)
PMV BLD AUTO: 9.9 FL (ref 8.9–12.9)
POTASSIUM SERPL-SCNC: 3.5 MMOL/L (ref 3.5–5.1)
RBC # BLD AUTO: 3.56 M/UL (ref 4.1–5.7)
SODIUM SERPL-SCNC: 134 MMOL/L (ref 136–145)
TROPONIN-HIGH SENSITIVITY: 374 NG/L (ref 0–76)
TROPONIN-HIGH SENSITIVITY: 378 NG/L (ref 0–76)
TROPONIN-HIGH SENSITIVITY: 456 NG/L (ref 0–76)
WBC # BLD AUTO: 6.2 K/UL (ref 4.1–11.1)

## 2022-08-08 PROCEDURE — 74011250637 HC RX REV CODE- 250/637: Performed by: INTERNAL MEDICINE

## 2022-08-08 PROCEDURE — 96376 TX/PRO/DX INJ SAME DRUG ADON: CPT

## 2022-08-08 PROCEDURE — 85027 COMPLETE CBC AUTOMATED: CPT

## 2022-08-08 PROCEDURE — 77010033678 HC OXYGEN DAILY

## 2022-08-08 PROCEDURE — 94640 AIRWAY INHALATION TREATMENT: CPT

## 2022-08-08 PROCEDURE — 93005 ELECTROCARDIOGRAM TRACING: CPT

## 2022-08-08 PROCEDURE — 97530 THERAPEUTIC ACTIVITIES: CPT

## 2022-08-08 PROCEDURE — 74011636637 HC RX REV CODE- 636/637: Performed by: HOSPITALIST

## 2022-08-08 PROCEDURE — 82962 GLUCOSE BLOOD TEST: CPT

## 2022-08-08 PROCEDURE — 94660 CPAP INITIATION&MGMT: CPT

## 2022-08-08 PROCEDURE — 74011250636 HC RX REV CODE- 250/636: Performed by: INTERNAL MEDICINE

## 2022-08-08 PROCEDURE — 36415 COLL VENOUS BLD VENIPUNCTURE: CPT

## 2022-08-08 PROCEDURE — 74011000250 HC RX REV CODE- 250: Performed by: INTERNAL MEDICINE

## 2022-08-08 PROCEDURE — 80048 BASIC METABOLIC PNL TOTAL CA: CPT

## 2022-08-08 PROCEDURE — 74011250637 HC RX REV CODE- 250/637: Performed by: HOSPITALIST

## 2022-08-08 PROCEDURE — G0378 HOSPITAL OBSERVATION PER HR: HCPCS

## 2022-08-08 PROCEDURE — 74011000250 HC RX REV CODE- 250: Performed by: HOSPITALIST

## 2022-08-08 PROCEDURE — 84484 ASSAY OF TROPONIN QUANT: CPT

## 2022-08-08 PROCEDURE — 96372 THER/PROPH/DIAG INJ SC/IM: CPT

## 2022-08-08 RX ORDER — HYDROMORPHONE HYDROCHLORIDE 1 MG/ML
1 INJECTION, SOLUTION INTRAMUSCULAR; INTRAVENOUS; SUBCUTANEOUS
Status: DISCONTINUED | OUTPATIENT
Start: 2022-08-08 | End: 2022-08-10

## 2022-08-08 RX ORDER — MORPHINE SULFATE 2 MG/ML
1 INJECTION, SOLUTION INTRAMUSCULAR; INTRAVENOUS
Status: DISCONTINUED | OUTPATIENT
Start: 2022-08-08 | End: 2022-08-08

## 2022-08-08 RX ORDER — IPRATROPIUM BROMIDE AND ALBUTEROL SULFATE 2.5; .5 MG/3ML; MG/3ML
3 SOLUTION RESPIRATORY (INHALATION)
Status: DISCONTINUED | OUTPATIENT
Start: 2022-08-08 | End: 2022-08-09

## 2022-08-08 RX ORDER — HYDROMORPHONE HYDROCHLORIDE 1 MG/ML
0.5 INJECTION, SOLUTION INTRAMUSCULAR; INTRAVENOUS; SUBCUTANEOUS
Status: DISCONTINUED | OUTPATIENT
Start: 2022-08-08 | End: 2022-08-10

## 2022-08-08 RX ORDER — MORPHINE SULFATE 2 MG/ML
1 INJECTION, SOLUTION INTRAMUSCULAR; INTRAVENOUS
Status: DISCONTINUED | OUTPATIENT
Start: 2022-08-10 | End: 2022-08-08

## 2022-08-08 RX ORDER — NITROGLYCERIN 0.4 MG/1
0.4 TABLET SUBLINGUAL AS NEEDED
Status: DISCONTINUED | OUTPATIENT
Start: 2022-08-08 | End: 2022-08-17 | Stop reason: HOSPADM

## 2022-08-08 RX ADMIN — Medication 1000 UNITS: at 12:04

## 2022-08-08 RX ADMIN — DOCUSATE SODIUM 100 MG: 100 CAPSULE, LIQUID FILLED ORAL at 20:39

## 2022-08-08 RX ADMIN — Medication 1 TABLET: at 12:03

## 2022-08-08 RX ADMIN — AMLODIPINE BESYLATE 10 MG: 5 TABLET ORAL at 12:03

## 2022-08-08 RX ADMIN — DOCUSATE SODIUM 100 MG: 100 CAPSULE, LIQUID FILLED ORAL at 12:03

## 2022-08-08 RX ADMIN — SODIUM CHLORIDE, PRESERVATIVE FREE 10 ML: 5 INJECTION INTRAVENOUS at 17:47

## 2022-08-08 RX ADMIN — Medication 0.4 MG: at 09:25

## 2022-08-08 RX ADMIN — RANOLAZINE 1000 MG: 500 TABLET, FILM COATED, EXTENDED RELEASE ORAL at 12:04

## 2022-08-08 RX ADMIN — IPRATROPIUM BROMIDE AND ALBUTEROL SULFATE 3 ML: 2.5; .5 SOLUTION RESPIRATORY (INHALATION) at 20:04

## 2022-08-08 RX ADMIN — PAROXETINE HYDROCHLORIDE 40 MG: 20 TABLET, FILM COATED ORAL at 12:07

## 2022-08-08 RX ADMIN — POLYETHYLENE GLYCOL 3350 17 G: 17 POWDER, FOR SOLUTION ORAL at 12:02

## 2022-08-08 RX ADMIN — Medication 1 CAPSULE: at 12:04

## 2022-08-08 RX ADMIN — FLUTICASONE PROPIONATE 2 SPRAY: 50 SPRAY, METERED NASAL at 12:05

## 2022-08-08 RX ADMIN — SODIUM CHLORIDE, PRESERVATIVE FREE 10 ML: 5 INJECTION INTRAVENOUS at 20:43

## 2022-08-08 RX ADMIN — ENOXAPARIN SODIUM 30 MG: 100 INJECTION SUBCUTANEOUS at 20:38

## 2022-08-08 RX ADMIN — HYDROMORPHONE HYDROCHLORIDE 1 MG: 1 INJECTION, SOLUTION INTRAMUSCULAR; INTRAVENOUS; SUBCUTANEOUS at 23:18

## 2022-08-08 RX ADMIN — POLYETHYLENE GLYCOL 3350 17 G: 17 POWDER, FOR SOLUTION ORAL at 12:04

## 2022-08-08 RX ADMIN — Medication 1 TABLET: at 17:42

## 2022-08-08 RX ADMIN — ASPIRIN 81 MG CHEWABLE TABLET 81 MG: 81 TABLET CHEWABLE at 12:04

## 2022-08-08 RX ADMIN — HYDROMORPHONE HYDROCHLORIDE 1 MG: 1 INJECTION, SOLUTION INTRAMUSCULAR; INTRAVENOUS; SUBCUTANEOUS at 17:41

## 2022-08-08 RX ADMIN — MORPHINE SULFATE 1 MG: 2 INJECTION, SOLUTION INTRAMUSCULAR; INTRAVENOUS at 10:14

## 2022-08-08 RX ADMIN — MORPHINE SULFATE 1 MG: 2 INJECTION, SOLUTION INTRAMUSCULAR; INTRAVENOUS at 15:47

## 2022-08-08 RX ADMIN — SODIUM CHLORIDE, PRESERVATIVE FREE 10 ML: 5 INJECTION INTRAVENOUS at 05:29

## 2022-08-08 RX ADMIN — CITALOPRAM HYDROBROMIDE 40 MG: 20 TABLET ORAL at 12:03

## 2022-08-08 RX ADMIN — INSULIN LISPRO 3 UNITS: 100 INJECTION, SOLUTION INTRAVENOUS; SUBCUTANEOUS at 12:23

## 2022-08-08 RX ADMIN — CYANOCOBALAMIN TAB 1000 MCG 1000 MCG: 1000 TAB at 09:00

## 2022-08-08 RX ADMIN — MORPHINE SULFATE 1 MG: 2 INJECTION, SOLUTION INTRAMUSCULAR; INTRAVENOUS at 05:29

## 2022-08-08 RX ADMIN — ATORVASTATIN CALCIUM 80 MG: 40 TABLET, FILM COATED ORAL at 12:04

## 2022-08-08 RX ADMIN — TRAMADOL HYDROCHLORIDE 50 MG: 50 TABLET, COATED ORAL at 00:31

## 2022-08-08 RX ADMIN — RANOLAZINE 1000 MG: 500 TABLET, FILM COATED, EXTENDED RELEASE ORAL at 20:39

## 2022-08-08 RX ADMIN — ENOXAPARIN SODIUM 30 MG: 100 INJECTION SUBCUTANEOUS at 12:02

## 2022-08-08 NOTE — PROGRESS NOTES
PHYSICAL THERAPY TREATMENT  Patient: Jacky Matamoros (73 y.o. male)  Date: 8/8/2022  Diagnosis: Dizziness [R42]  Syncope [R55] <principal problem not specified>      Precautions:    Chart, physical therapy assessment, plan of care and goals were reviewed. ASSESSMENT  Patient continues with skilled PT services and is progressing towards goals. Patient supine in bed upon approach and agreed to therapy session today. Patient able to recall spinal precautions and log rolling technique for bed mobility. Patient was mod A with additional time for bed mobility( via log roll), supine<>sit and scooting EOB. Patient demonstrated good unsupported sitting balance while EOB Patient declined OOB mobility 2/2 to back pain and already having attempted ambulation earlier that day. Patient then performed seated TE ( see details below). Patient required rest breaks after each set 2/2 to increased pain in lower back with mobility. Patient then was mod Ax1 for sit>supine transfer back into bed via log rolling technique. Patient then left semi supine in bed with call bell with in reach and all needs meet. Current Level of Function Impacting Discharge (mobility/balance): impaired balance general weakness, poor activity tolerance, pain with mobility    Other factors to consider for discharge: PLOF, assistance at home, level of deficits, acute medical state, pain control         PLAN :  Patient continues to benefit from skilled intervention to address the above impairments. Continue treatment per established plan of care. to address goals.     Recommendation for discharge: (in order for the patient to meet his/her long term goals)  [address] [facility]     This discharge recommendation:  Has been made in collaboration with the attending provider and/or case management    IF patient discharges home will need the following DME: to be determined (TBD)       SUBJECTIVE:   Patient stated I tried to do some walking earlier.     OBJECTIVE DATA SUMMARY:   Critical Behavior:  Neurologic State: Alert  Orientation Level: Oriented X4  Cognition: Follows commands     Functional Mobility Training:  Bed Mobility:  Rolling: Moderate assistance; Additional time;Assist x1  Supine to Sit: Moderate assistance; Additional time;Assist x1  Sit to Supine: Moderate assistance; Additional time;Assist x1  Scooting: Moderate assistance; Additional time;Assist x1  Transfers:  Sit to Stand: Minimum assistance;Assist x2  Stand to Sit: Minimum assistance; Additional time  Balance:  Sitting: Impaired; With support  Sitting - Static: Good (unsupported)  Sitting - Dynamic: Fair (occasional)  Standing: Impaired; With support  Standing - Static: Fair;Constant support  Standing - Dynamic : Fair;Constant support    Therapeutic Exercises:   X20 AP  1x15 LAQ  1x15 seated marches  Pain Ratin/10 in lower back, 2-3/10 in chest    Activity Tolerance:   Fair  Please refer to the flowsheet for vital signs taken during this treatment. After treatment patient left in no apparent distress:   Bed returned to lowest position, Supine in bed, Call bell within reach, and Side rails x 3    COMMUNICATION/COLLABORATION:   The patients plan of care was discussed with: Registered nurse. Problem: Mobility Impaired (Adult and Pediatric)  Goal: *Acute Goals and Plan of Care (Insert Text)  Description: Physical Therapy Goals  Initiated 22  1)  I with HEP in 7 days to improve overall functional mobility. 2)  Bed mobility with min A in 7 days to prevent skin breakdown. 3)  Pt to perform stand pivot transfer from bed to chair with CGA in 7 days. 3)  Pt to amb 50ft with LRAD and CGA in 7 days    Pt. Goal:  Pt to be able to walk safely without falls.      Outcome: Progressing Towards Goal       Chicho Ramey PTA   Time Calculation: 32 mins

## 2022-08-08 NOTE — PROGRESS NOTES
OCCUPATIONAL THERAPY TREATMENT  Patient: Miguel Pinedo (73 y.o. male)  Date: 8/8/2022  Diagnosis: Dizziness [R42]  Syncope [R55] <principal problem not specified>      Precautions: fall risk, spinal precautions   Chart, occupational therapy assessment, plan of care, and goals were reviewed. ASSESSMENT  Pt continues with skilled OT services and is slowly progressing towards goals. Pt received sitting EOB with RN present requesting to transfer to [facility]. Pt AXO x4 and agreeable to OT tx, currently c/o of 10/10 back pain (RN aware). Functional Mobility and Transfers for ADLs:  Bed Mobility:  Rolling: Moderate assistance; Additional time via log roll technique   Sit to Supine: Moderate assistance;Assist x2    Transfers:  Sit to Stand: Minimum assistance;Assist x2  Functional Transfers  Toilet Transfer : Minimum assistance     Balance:  Sitting: Impaired; Without support  Sitting - Static: Good (unsupported)  Sitting - Dynamic: Fair (occasional)  Standing: Impaired; With support  Standing - Static: Fair;Constant support  Standing - Dynamic : Fair;Constant support    ADL Intervention:  Toileting  Toileting Assistance: Total assistance(dependent)  Bladder Hygiene: Total assistance (dependent)    Overall, pt continues to present with deficits in generalized strength/AROM, bed mobility, static/dynamic sitting and standing balance and functional activity tolerance during performance of ADLs/mobility; c/o 10/10 back pain (currently denies any chest pain) and requires cues throughout session for PLB techniques due to increased work of breathing with activity (SPO2 89-93% on 2L via NC), however is making slow steady progress towards noted OT goals. Will continue to progress. Recommend d/c to IRF once medically appropriate.      Other factors to consider for discharge: family support, DME, time since onset, severity of deficits          PLAN :  Patient continues to benefit from skilled intervention to address the above impairments. Continue treatment per established plan of care. to address goals. Recommend with staff: Encourage participation in bed level ADLs as tolerated    Recommend next OT session: LB dressing    Recommendation for discharge: (in order for the patient to meet his/her long term goals)  1 [facility],[address]     This discharge recommendation:  Has been made in collaboration with the attending provider and/or case management       SUBJECTIVE:   Patient stated My back is hurting today.     OBJECTIVE DATA SUMMARY:   Cognitive/Behavioral Status:  Neurologic State: Alert  Orientation Level: Oriented X4  Cognition: Follows commands             Functional Mobility and Transfers for ADLs:  Bed Mobility:  Rolling: Moderate assistance; Additional time  Sit to Supine: Moderate assistance;Assist x2    Transfers:  Sit to Stand: Minimum assistance;Assist x2  Functional Transfers  Toilet Transfer : Minimum assistance       Balance:  Sitting: Impaired; Without support  Sitting - Static: Good (unsupported)  Sitting - Dynamic: Fair (occasional)  Standing: Impaired; With support  Standing - Static: Fair;Constant support  Standing - Dynamic : Fair;Constant support    ADL Intervention:  Toileting  Toileting Assistance: Total assistance(dependent)  Bladder Hygiene: Total assistance (dependent)    Therapeutic Exercises:   Pt educated on UE HEP to complete throughout the day to improve ROM and strength with good understanding verbalized and demonstrated. Pain:  10/10 back, RN aware    Activity Tolerance:   Fair, desaturates with exertion and requires oxygen, and requires frequent rest breaks    After treatment patient left in no apparent distress:   Supine in bed, Call bell within reach, Bed / chair alarm activated, and Side rails x 3    COMMUNICATION/COLLABORATION:   The patients plan of care was discussed with: Registered nurseTahir Augustin  Time Calculation: 32 mins    Problem: Self Care Deficits Care Plan (Adult)  Goal: *Acute Goals and Plan of Care (Insert Text)  Description: Pt stated goal \"to get out of bed\"  Pt will be Mod I sup <> sit in prep for EOB ADLs  Pt will be Mod I grooming standing sink side LRAD  Pt will be Mod I UB dressing sitting EOB/long sit   Pt will be Mod I LE dressing sitting EOB/long sit using AE PRN  Pt will be Mod I sit <>  prep for toileting LRAD  Pt will be Mod I toileting/toilet transfer/cloth mgmt LRAD  Pt will be IND following UE HEP in prep for self care tasks      Outcome: Progressing Towards Goal

## 2022-08-08 NOTE — PROGRESS NOTES
[facility] and Henrry Solares notified of troponin of 456.  DR Leny Mcgrath stated he will be up here and see him shortly
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
## 2022-08-08 NOTE — PROGRESS NOTES
Patient given SL nitrotab. States pain is some better but he still has it. MD ordered troponin and ekg.  Call bell in reach and family at bedside

## 2022-08-08 NOTE — PROGRESS NOTES
Progress Note      8/8/2022 2:38 PM  NAME: Lenny Noble   MRN:  [id number]   Admit Diagnosis: Dizziness [R42]  Syncope [R55]      Subjective:   Patient is a still having pain in his back and upper chest area. Patient is in the chair and is somewhat more comfortable. Patient complains of pain in epigastric area and lower chest and he had a blood work done and troponin I is 485. Discussed with the nurse. Review of Systems:    Symptom Y/N Comments  Symptom Y/N Comments   Fever/Chills n   Chest Pain y    Poor Appetite    Edema     Cough    Abdominal Pain n    Sputum    Joint Pain     SOB/OSCAR n   Pruritis/Rash     Nausea/vomit    Back pain y    Diarrhea         Constipation           Could NOT obtain due to:      Objective:        Physical Exam:    Last 24hrs VS reviewed since prior progress note. Most recent are:    Visit Vitals  BP (!) 104/59   Pulse 72   Temp 98.5 °F (36.9 °C)   Resp 20   Ht 5' 8\" (1.727 m)   SpO2 93%   BMI 44.09 kg/m²     No intake or output data in the 24 hours ending 08/08/22 1150       General Appearance: Well developed, well nourished, alert & oriented x 3,    no acute distress. Ears/Nose/Mouth/Throat: Hearing grossly normal.  Neck: Supple. Chest: Lungs clear to auscultation bilaterally. Cardiovascular: Regular rate and rhythm, S1,S2 normal, no murmur. Abdomen: Soft, non-tender, bowel sounds are active. Extremities: No edema bilaterally. Skin: Warm and dry. []         Post-cath site without hematoma, bruit, tenderness, or thrill. Distal pulses intact. PMH/SH reviewed - no change compared to H&P    Data Review    Telemetry: normal sinus rhythm     EKG:   []  No new EKG for review    Lab Data Personally Reviewed:    Recent Labs     08/08/22  0645 08/07/22  0917   WBC 6.2 8.5   HGB 11.9* 12.6   HCT 35.0* 37.1    183       No results for input(s): INR, PTP, APTT, INREXT, INREXT in the last 72 hours.    Recent Labs     08/08/22  0645 08/07/22  4971 08/06/22  0913 * 133* 135*   K 3.5 3.6 4.0    98 102   CO2 26 26 27   BUN 17 19 16   CREA 1.00 1.03 1.11   * 140* 160*   CA 8.7 9.3 9.7       No results for input(s): CPK, CKNDX, TROIQ in the last 72 hours. No lab exists for component: CPKMB  No results found for: CHOL, CHOLX, CHLST, CHOLV, HDL, HDLP, LDL, LDLC, DLDLP, TGLX, TRIGL, TRIGP, CHHD, CHHDX    No results for input(s): AP, TBIL, TP, ALB, GLOB, GGT, AML, LPSE in the last 72 hours. No lab exists for component: SGOT, GPT, AMYP, HLPSE    No results for input(s): PH, PCO2, PO2 in the last 72 hours.     Medications Personally Reviewed:    Current Facility-Administered Medications   Medication Dose Route Frequency    nitroglycerin (NITROSTAT) tablet 0.4 mg  0.4 mg SubLINGual PRN    atorvastatin (LIPITOR) tablet 80 mg  80 mg Oral DAILY    citalopram (CELEXA) tablet 40 mg  40 mg Oral DAILY    PARoxetine (PAXIL) tablet 40 mg  40 mg Oral DAILY    zolpidem (AMBIEN) tablet 5 mg  5 mg Oral QHS PRN    aspirin chewable tablet 81 mg  81 mg Oral DAILY    cholecalciferol (VITAMIN D3) (1000 Units /25 mcg) tablet 1,000 Units  1,000 Units Oral DAILY    cyanocobalamin tablet 1,000 mcg  1,000 mcg Oral DAILY    vitamin B complex capsule 1 Capsule  1 Capsule Oral DAILY    fluticasone propionate (FLONASE) 50 mcg/actuation nasal spray 2 Spray  2 Spray Both Nostrils DAILY    albuterol (PROVENTIL HFA, VENTOLIN HFA, PROAIR HFA) inhaler 2 Puff  2 Puff Inhalation Q4H PRN    amLODIPine (NORVASC) tablet 10 mg  10 mg Oral DAILY    insulin glargine (LANTUS) injection 16 Units  16 Units SubCUTAneous QHS    ranolazine ER (RANEXA) tablet 1,000 mg  1,000 mg Oral BID    calcium-vitamin D 600 mg-5 mcg (200 unit) per tablet 1 Tablet  1 Tablet Oral BID WITH MEALS    sodium chloride (NS) flush 5-40 mL  5-40 mL IntraVENous Q8H    sodium chloride (NS) flush 5-40 mL  5-40 mL IntraVENous PRN    acetaminophen (TYLENOL) tablet 650 mg  650 mg Oral Q6H PRN    docusate sodium (COLACE) capsule 100 mg 100 mg Oral BID    insulin lispro (HUMALOG) injection   SubCUTAneous AC&HS    glucose chewable tablet 16 g  4 Tablet Oral PRN    glucagon (GLUCAGEN) injection 1 mg  1 mg IntraMUSCular PRN    dextrose 10% infusion 0-250 mL  0-250 mL IntraVENous PRN    traMADoL (ULTRAM) tablet 50 mg  50 mg Oral Q6H PRN    enoxaparin (LOVENOX) injection 30 mg  30 mg SubCUTAneous Q12H    polyethylene glycol (MIRALAX) packet 17 g  17 g Oral DAILY PRN             Problem List:   Echocardiograph results explained and patient has a dilated left ventricle with severe left ventricular systolic dysfunction. Probably syncope related to alcohol intoxication. S/p ICD. Diabetes mellitus type 2. Fracture of the spine. Patient experienced epigastric/lower chest pain and her troponin I is minimally elevated. Assessment/Plan:   From cardiac viewpoint I will continue present care. I am not completely convinced that his this chest pain is a presentation of acute coronary syndrome however because his troponin I is minimally elevated I will repeat again troponin I tomorrow. I we will follow pulmonary and orthopedic recommendations. 2.           [x]       High complexity decision making was performed in this patient at high risk for decompensation with multiple organ involvement.     Kendrick Romero MD

## 2022-08-08 NOTE — PROGRESS NOTES
Patient c/o abdomen/chest pain. More on the left side.  Dr. Vincent Courser Informed and orders received

## 2022-08-08 NOTE — PROGRESS NOTES
CM reviewed chart, noted PT recommends IRF. Met with family to discuss choices. They are leaning toward [facility], but would like some time to confirm their choice. Patient has [employer]. CM advised his family that insurance may deny auth for IRF, and asked for a SNF choice as well. CM provided family with list of IRFs and SNFs in the area. CM will continue to follow.
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
## 2022-08-09 LAB
ANION GAP SERPL CALC-SCNC: 8 MMOL/L (ref 5–15)
ATRIAL RATE: 72 BPM
BUN SERPL-MCNC: 16 MG/DL (ref 6–20)
BUN/CREAT SERPL: 16 (ref 12–20)
CA-I BLD-MCNC: 8.4 MG/DL (ref 8.5–10.1)
CALCULATED P AXIS, ECG09: 64 DEGREES
CALCULATED R AXIS, ECG10: -32 DEGREES
CALCULATED T AXIS, ECG11: 71 DEGREES
CHLORIDE SERPL-SCNC: 103 MMOL/L (ref 97–108)
CO2 SERPL-SCNC: 25 MMOL/L (ref 21–32)
CREAT SERPL-MCNC: 1 MG/DL (ref 0.7–1.3)
DIAGNOSIS, 93000: NORMAL
ERYTHROCYTE [DISTWIDTH] IN BLOOD BY AUTOMATED COUNT: 13.4 % (ref 11.5–14.5)
GLUCOSE BLD STRIP.AUTO-MCNC: 127 MG/DL (ref 65–117)
GLUCOSE BLD STRIP.AUTO-MCNC: 142 MG/DL (ref 65–117)
GLUCOSE BLD STRIP.AUTO-MCNC: 143 MG/DL (ref 65–117)
GLUCOSE BLD STRIP.AUTO-MCNC: 145 MG/DL (ref 65–117)
GLUCOSE BLD STRIP.AUTO-MCNC: 218 MG/DL (ref 65–117)
GLUCOSE SERPL-MCNC: 130 MG/DL (ref 65–100)
HCT VFR BLD AUTO: 36.1 % (ref 36.6–50.3)
HGB BLD-MCNC: 12.3 G/DL (ref 12.1–17)
MCH RBC QN AUTO: 33.5 PG (ref 26–34)
MCHC RBC AUTO-ENTMCNC: 34.1 G/DL (ref 30–36.5)
MCV RBC AUTO: 98.4 FL (ref 80–99)
NRBC # BLD: 0 K/UL (ref 0–0.01)
NRBC BLD-RTO: 0 PER 100 WBC
P-R INTERVAL, ECG05: 176 MS
PERFORMED BY, TECHID: ABNORMAL
PLATELET # BLD AUTO: 177 K/UL (ref 150–400)
PMV BLD AUTO: 10 FL (ref 8.9–12.9)
POTASSIUM SERPL-SCNC: 3.6 MMOL/L (ref 3.5–5.1)
Q-T INTERVAL, ECG07: 460 MS
QRS DURATION, ECG06: 146 MS
QTC CALCULATION (BEZET), ECG08: 503 MS
RBC # BLD AUTO: 3.67 M/UL (ref 4.1–5.7)
SODIUM SERPL-SCNC: 136 MMOL/L (ref 136–145)
TROPONIN-HIGH SENSITIVITY: 336 NG/L (ref 0–76)
VENTRICULAR RATE, ECG03: 72 BPM
WBC # BLD AUTO: 6.3 K/UL (ref 4.1–11.1)

## 2022-08-09 PROCEDURE — 97530 THERAPEUTIC ACTIVITIES: CPT

## 2022-08-09 PROCEDURE — 0QS03ZZ REPOSITION LUMBAR VERTEBRA, PERCUTANEOUS APPROACH: ICD-10-PCS | Performed by: STUDENT IN AN ORGANIZED HEALTH CARE EDUCATION/TRAINING PROGRAM

## 2022-08-09 PROCEDURE — 80048 BASIC METABOLIC PNL TOTAL CA: CPT

## 2022-08-09 PROCEDURE — 74011000250 HC RX REV CODE- 250: Performed by: INTERNAL MEDICINE

## 2022-08-09 PROCEDURE — 36415 COLL VENOUS BLD VENIPUNCTURE: CPT

## 2022-08-09 PROCEDURE — 94762 N-INVAS EAR/PLS OXIMTRY CONT: CPT

## 2022-08-09 PROCEDURE — 0QU03JZ SUPPLEMENT LUMBAR VERTEBRA WITH SYNTHETIC SUBSTITUTE, PERCUTANEOUS APPROACH: ICD-10-PCS | Performed by: STUDENT IN AN ORGANIZED HEALTH CARE EDUCATION/TRAINING PROGRAM

## 2022-08-09 PROCEDURE — 94761 N-INVAS EAR/PLS OXIMETRY MLT: CPT

## 2022-08-09 PROCEDURE — 96372 THER/PROPH/DIAG INJ SC/IM: CPT

## 2022-08-09 PROCEDURE — 84484 ASSAY OF TROPONIN QUANT: CPT

## 2022-08-09 PROCEDURE — 77010033678 HC OXYGEN DAILY

## 2022-08-09 PROCEDURE — 94640 AIRWAY INHALATION TREATMENT: CPT

## 2022-08-09 PROCEDURE — 85027 COMPLETE CBC AUTOMATED: CPT

## 2022-08-09 PROCEDURE — 96376 TX/PRO/DX INJ SAME DRUG ADON: CPT

## 2022-08-09 PROCEDURE — 74011000250 HC RX REV CODE- 250: Performed by: HOSPITALIST

## 2022-08-09 PROCEDURE — G0378 HOSPITAL OBSERVATION PER HR: HCPCS

## 2022-08-09 PROCEDURE — 82962 GLUCOSE BLOOD TEST: CPT

## 2022-08-09 PROCEDURE — 74011250636 HC RX REV CODE- 250/636: Performed by: INTERNAL MEDICINE

## 2022-08-09 PROCEDURE — 74011636637 HC RX REV CODE- 636/637: Performed by: HOSPITALIST

## 2022-08-09 PROCEDURE — 74011250637 HC RX REV CODE- 250/637: Performed by: HOSPITALIST

## 2022-08-09 PROCEDURE — 74011250637 HC RX REV CODE- 250/637: Performed by: INTERNAL MEDICINE

## 2022-08-09 RX ORDER — IPRATROPIUM BROMIDE AND ALBUTEROL SULFATE 2.5; .5 MG/3ML; MG/3ML
3 SOLUTION RESPIRATORY (INHALATION)
Status: DISCONTINUED | OUTPATIENT
Start: 2022-08-09 | End: 2022-08-17 | Stop reason: HOSPADM

## 2022-08-09 RX ADMIN — IPRATROPIUM BROMIDE AND ALBUTEROL SULFATE 3 ML: 2.5; .5 SOLUTION RESPIRATORY (INHALATION) at 07:39

## 2022-08-09 RX ADMIN — CYANOCOBALAMIN TAB 1000 MCG 1000 MCG: 1000 TAB at 09:53

## 2022-08-09 RX ADMIN — IPRATROPIUM BROMIDE AND ALBUTEROL SULFATE 3 ML: 2.5; .5 SOLUTION RESPIRATORY (INHALATION) at 19:25

## 2022-08-09 RX ADMIN — IPRATROPIUM BROMIDE AND ALBUTEROL SULFATE 3 ML: 2.5; .5 SOLUTION RESPIRATORY (INHALATION) at 01:56

## 2022-08-09 RX ADMIN — INSULIN LISPRO 3 UNITS: 100 INJECTION, SOLUTION INTRAVENOUS; SUBCUTANEOUS at 09:52

## 2022-08-09 RX ADMIN — SODIUM CHLORIDE, PRESERVATIVE FREE 10 ML: 5 INJECTION INTRAVENOUS at 17:21

## 2022-08-09 RX ADMIN — INSULIN LISPRO 4 UNITS: 100 INJECTION, SOLUTION INTRAVENOUS; SUBCUTANEOUS at 12:16

## 2022-08-09 RX ADMIN — Medication 1 TABLET: at 12:16

## 2022-08-09 RX ADMIN — IPRATROPIUM BROMIDE AND ALBUTEROL SULFATE 3 ML: 2.5; .5 SOLUTION RESPIRATORY (INHALATION) at 14:52

## 2022-08-09 RX ADMIN — ATORVASTATIN CALCIUM 80 MG: 40 TABLET, FILM COATED ORAL at 09:54

## 2022-08-09 RX ADMIN — SODIUM CHLORIDE, PRESERVATIVE FREE 10 ML: 5 INJECTION INTRAVENOUS at 06:01

## 2022-08-09 RX ADMIN — Medication 1 TABLET: at 17:20

## 2022-08-09 RX ADMIN — DOCUSATE SODIUM 100 MG: 100 CAPSULE, LIQUID FILLED ORAL at 22:02

## 2022-08-09 RX ADMIN — ASPIRIN 81 MG CHEWABLE TABLET 81 MG: 81 TABLET CHEWABLE at 09:53

## 2022-08-09 RX ADMIN — SODIUM CHLORIDE, PRESERVATIVE FREE 10 ML: 5 INJECTION INTRAVENOUS at 22:01

## 2022-08-09 RX ADMIN — RANOLAZINE 1000 MG: 500 TABLET, FILM COATED, EXTENDED RELEASE ORAL at 09:53

## 2022-08-09 RX ADMIN — HYDROMORPHONE HYDROCHLORIDE 1 MG: 1 INJECTION, SOLUTION INTRAMUSCULAR; INTRAVENOUS; SUBCUTANEOUS at 12:51

## 2022-08-09 RX ADMIN — HYDROMORPHONE HYDROCHLORIDE 1 MG: 1 INJECTION, SOLUTION INTRAMUSCULAR; INTRAVENOUS; SUBCUTANEOUS at 03:15

## 2022-08-09 RX ADMIN — INSULIN GLARGINE 16 UNITS: 100 INJECTION, SOLUTION SUBCUTANEOUS at 22:02

## 2022-08-09 RX ADMIN — POLYETHYLENE GLYCOL 3350 17 G: 17 POWDER, FOR SOLUTION ORAL at 10:06

## 2022-08-09 RX ADMIN — AMLODIPINE BESYLATE 10 MG: 5 TABLET ORAL at 09:53

## 2022-08-09 RX ADMIN — FLUTICASONE PROPIONATE 2 SPRAY: 50 SPRAY, METERED NASAL at 12:17

## 2022-08-09 RX ADMIN — Medication 1 CAPSULE: at 09:53

## 2022-08-09 RX ADMIN — RANOLAZINE 1000 MG: 500 TABLET, FILM COATED, EXTENDED RELEASE ORAL at 22:02

## 2022-08-09 RX ADMIN — Medication 1000 UNITS: at 09:53

## 2022-08-09 RX ADMIN — HYDROMORPHONE HYDROCHLORIDE 1 MG: 1 INJECTION, SOLUTION INTRAMUSCULAR; INTRAVENOUS; SUBCUTANEOUS at 17:19

## 2022-08-09 RX ADMIN — HYDROMORPHONE HYDROCHLORIDE 1 MG: 1 INJECTION, SOLUTION INTRAMUSCULAR; INTRAVENOUS; SUBCUTANEOUS at 22:07

## 2022-08-09 RX ADMIN — HYDROMORPHONE HYDROCHLORIDE 1 MG: 1 INJECTION, SOLUTION INTRAMUSCULAR; INTRAVENOUS; SUBCUTANEOUS at 08:20

## 2022-08-09 RX ADMIN — DOCUSATE SODIUM 100 MG: 100 CAPSULE, LIQUID FILLED ORAL at 09:53

## 2022-08-09 RX ADMIN — PAROXETINE HYDROCHLORIDE 40 MG: 20 TABLET, FILM COATED ORAL at 09:54

## 2022-08-09 RX ADMIN — CITALOPRAM HYDROBROMIDE 40 MG: 20 TABLET ORAL at 09:53

## 2022-08-09 RX ADMIN — ENOXAPARIN SODIUM 30 MG: 100 INJECTION SUBCUTANEOUS at 09:51
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
## 2022-08-09 NOTE — PROGRESS NOTES
PT attempted to see pt for PT treatment, pt flat on back in bed and states he is too uncomfortable right now and it's not a good day as he did not get much rest last night. Pt requests PT come back tomorrow morning as able.   Will hold PT to day and attempt again at a later time per pt request.
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
## 2022-08-09 NOTE — PROGRESS NOTES
CM received a phone call from patient's daughter, Conchita Nicholson ([phone]) [facility] Choice letter completed and placed on chart. Referral sent via [employer]. CM will continue to follow.

## 2022-08-09 NOTE — PROGRESS NOTES
Progress Note      8/9/2022 2:38 PM  NAME: Kate Bird   MRN:  [id number]   Admit Diagnosis: Dizziness [R42]  Syncope [R55]      Subjective:   Patient is a still having pain in his back and upper chest area. Patient had a some chest discomfort and still has a significant shortness of breath. Patient complains of pain in epigastric area and lower chest and he had a blood work done and troponin I is 330. Discussed with the nurse. Review of Systems:    Symptom Y/N Comments  Symptom Y/N Comments   Fever/Chills n   Chest Pain y    Poor Appetite    Edema     Cough    Abdominal Pain n    Sputum    Joint Pain     SOB/OSCAR n   Pruritis/Rash     Nausea/vomit    Back pain y    Diarrhea         Constipation           Could NOT obtain due to:      Objective:        Physical Exam:    Last 24hrs VS reviewed since prior progress note. Most recent are:    Visit Vitals  /72 (BP 1 Location: Left upper arm, BP Patient Position: Supine)   Pulse 71   Temp 98.5 °F (36.9 °C)   Resp 20   Ht 5' 8\" (1.727 m)   SpO2 92%   BMI 44.09 kg/m²       Intake/Output Summary (Last 24 hours) at 8/9/2022 1050  Last data filed at 8/9/2022 1000  Gross per 24 hour   Intake 1000 ml   Output 1050 ml   Net -50 ml          General Appearance: Well developed, well nourished, alert & oriented x 3,    no acute distress. Ears/Nose/Mouth/Throat: Hearing grossly normal.  Neck: Supple. Chest: Patient has some wheezing. Cardiovascular: Regular rate and rhythm, S1,S2 normal, no murmur. Abdomen: Soft, non-tender, bowel sounds are active. Extremities: No edema bilaterally. Skin: Warm and dry. []         Post-cath site without hematoma, bruit, tenderness, or thrill. Distal pulses intact.     PMH/SH reviewed - no change compared to H&P    Data Review    Telemetry: normal sinus rhythm     EKG:   []  No new EKG for review    Lab Data Personally Reviewed:    Recent Labs     08/09/22  0649 08/08/22  0645   WBC 6.3 6.2   HGB 12.3 11.9*   HCT 36.1* 35.0*    174       No results for input(s): INR, PTP, APTT, INREXT, INREXT in the last 72 hours. Recent Labs     08/09/22  0649 08/08/22  0645 08/07/22  0917    134* 133*   K 3.6 3.5 3.6    101 98   CO2 25 26 26   BUN 16 17 19   CREA 1.00 1.00 1.03   * 148* 140*   CA 8.4* 8.7 9.3       No results for input(s): CPK, CKNDX, TROIQ in the last 72 hours. No lab exists for component: CPKMB  No results found for: CHOL, CHOLX, CHLST, CHOLV, HDL, HDLP, LDL, LDLC, DLDLP, TGLX, TRIGL, TRIGP, CHHD, CHHDX    No results for input(s): AP, TBIL, TP, ALB, GLOB, GGT, AML, LPSE in the last 72 hours. No lab exists for component: SGOT, GPT, AMYP, HLPSE    No results for input(s): PH, PCO2, PO2 in the last 72 hours.     Medications Personally Reviewed:    Current Facility-Administered Medications   Medication Dose Route Frequency    albuterol-ipratropium (DUO-NEB) 2.5 MG-0.5 MG/3 ML  3 mL Nebulization Q6H RT    nitroglycerin (NITROSTAT) tablet 0.4 mg  0.4 mg SubLINGual PRN    HYDROmorphone (DILAUDID) injection 1 mg  1 mg IntraVENous Q4H PRN    HYDROmorphone (DILAUDID) syringe 0.5 mg  0.5 mg IntraVENous Q4H PRN    atorvastatin (LIPITOR) tablet 80 mg  80 mg Oral DAILY    citalopram (CELEXA) tablet 40 mg  40 mg Oral DAILY    PARoxetine (PAXIL) tablet 40 mg  40 mg Oral DAILY    zolpidem (AMBIEN) tablet 5 mg  5 mg Oral QHS PRN    aspirin chewable tablet 81 mg  81 mg Oral DAILY    cholecalciferol (VITAMIN D3) (1000 Units /25 mcg) tablet 1,000 Units  1,000 Units Oral DAILY    cyanocobalamin tablet 1,000 mcg  1,000 mcg Oral DAILY    vitamin B complex capsule 1 Capsule  1 Capsule Oral DAILY    fluticasone propionate (FLONASE) 50 mcg/actuation nasal spray 2 Spray  2 Spray Both Nostrils DAILY    albuterol (PROVENTIL HFA, VENTOLIN HFA, PROAIR HFA) inhaler 2 Puff  2 Puff Inhalation Q4H PRN    amLODIPine (NORVASC) tablet 10 mg  10 mg Oral DAILY    insulin glargine (LANTUS) injection 16 Units  16 Units SubCUTAneous QHS ranolazine ER (RANEXA) tablet 1,000 mg  1,000 mg Oral BID    calcium-vitamin D 600 mg-5 mcg (200 unit) per tablet 1 Tablet  1 Tablet Oral BID WITH MEALS    sodium chloride (NS) flush 5-40 mL  5-40 mL IntraVENous Q8H    sodium chloride (NS) flush 5-40 mL  5-40 mL IntraVENous PRN    acetaminophen (TYLENOL) tablet 650 mg  650 mg Oral Q6H PRN    docusate sodium (COLACE) capsule 100 mg  100 mg Oral BID    insulin lispro (HUMALOG) injection   SubCUTAneous AC&HS    glucose chewable tablet 16 g  4 Tablet Oral PRN    glucagon (GLUCAGEN) injection 1 mg  1 mg IntraMUSCular PRN    dextrose 10% infusion 0-250 mL  0-250 mL IntraVENous PRN    traMADoL (ULTRAM) tablet 50 mg  50 mg Oral Q6H PRN    enoxaparin (LOVENOX) injection 30 mg  30 mg SubCUTAneous Q12H    polyethylene glycol (MIRALAX) packet 17 g  17 g Oral DAILY PRN             Problem List:   Echocardiograph results explained and patient has a dilated left ventricle with severe left ventricular systolic dysfunction. Probably syncope related to alcohol intoxication. S/p ICD. Diabetes mellitus type 2. Fracture of the spine. Patient experienced epigastric/lower chest pain and her troponin I is minimally elevated. I am not convinced that this is a presentation of acute MI. Assessment/Plan:   From cardiac viewpoint I will continue present care. I am not completely convinced that his this chest pain is a presentation of acute coronary syndrome however because his troponin I is minimally elevated I will repeat again troponin I tomorrow. I we will follow pulmonary and orthopedic recommendations. 2.           [x]       High complexity decision making was performed in this patient at high risk for decompensation with multiple organ involvement.     Irineo Naylor MD
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
## 2022-08-09 NOTE — PROGRESS NOTES
NAME:     Cassandra Oro   :       1955   MRN:       [id number]   DATE:      2022    POD:    * No surgery found *  S/P:        SUBJECTIVE:    Patient c/o moderate low back pain today  Denies LE radicular symptoms      Recent Labs     22  0649   HGB 12.3   HCT 36.1*      K 3.6      CO2 25   BUN 16   CREA 1.00   *     Patient Vitals for the past 12 hrs:   BP Temp Pulse Resp SpO2   22 0739 -- -- -- -- 92 %   22 0733 123/72 98.5 °F (36.9 °C) 71 20 91 %   22 0157 -- -- -- -- 93 %       EXAM:    Physical Exam  No motor or sensory deficits noted. Calves NTTP bilaterally  Distal pulses palpable bilaterally.   Lspine TTP at L3 area  5/5 strength b/l LE      PLAN:  Continue conservative care  No role for surgery at this time  Continue PT  F/U 2 weeks with Dr. Ana Chaves to d/c home or rehab when stable     Vernona Gaucher, PA-C
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
## 2022-08-09 NOTE — PROGRESS NOTES
Dr. Amee Rodríguez notified of troponin of 336 this am and that patient is still c/o chest pain on left side of chest. Dr. Amee Rodríguez stated that he will be rounding soon and will see the patient

## 2022-08-10 ENCOUNTER — APPOINTMENT (OUTPATIENT)
Dept: INTERVENTIONAL RADIOLOGY/VASCULAR | Age: 67
DRG: 515 | End: 2022-08-10
Attending: PHYSICIAN ASSISTANT
Payer: MEDICARE

## 2022-08-10 PROBLEM — J96.01 ACUTE HYPOXEMIC RESPIRATORY FAILURE (HCC): Status: ACTIVE | Noted: 2022-08-10

## 2022-08-10 LAB
ANION GAP SERPL CALC-SCNC: 6 MMOL/L (ref 5–15)
BASOPHILS # BLD: 0 K/UL (ref 0–0.1)
BASOPHILS NFR BLD: 1 % (ref 0–1)
BUN SERPL-MCNC: 15 MG/DL (ref 6–20)
BUN/CREAT SERPL: 17 (ref 12–20)
CA-I BLD-MCNC: 9 MG/DL (ref 8.5–10.1)
CHLORIDE SERPL-SCNC: 104 MMOL/L (ref 97–108)
CO2 SERPL-SCNC: 26 MMOL/L (ref 21–32)
CREAT SERPL-MCNC: 0.9 MG/DL (ref 0.7–1.3)
DIFFERENTIAL METHOD BLD: ABNORMAL
EOSINOPHIL # BLD: 0.2 K/UL (ref 0–0.4)
EOSINOPHIL NFR BLD: 4 % (ref 0–7)
ERYTHROCYTE [DISTWIDTH] IN BLOOD BY AUTOMATED COUNT: 13.3 % (ref 11.5–14.5)
GLUCOSE BLD STRIP.AUTO-MCNC: 121 MG/DL (ref 65–117)
GLUCOSE BLD STRIP.AUTO-MCNC: 124 MG/DL (ref 65–117)
GLUCOSE BLD STRIP.AUTO-MCNC: 194 MG/DL (ref 65–117)
GLUCOSE BLD STRIP.AUTO-MCNC: 227 MG/DL (ref 65–117)
GLUCOSE SERPL-MCNC: 132 MG/DL (ref 65–100)
HCT VFR BLD AUTO: 37.6 % (ref 36.6–50.3)
HGB BLD-MCNC: 12.6 G/DL (ref 12.1–17)
IMM GRANULOCYTES # BLD AUTO: 0 K/UL (ref 0–0.04)
IMM GRANULOCYTES NFR BLD AUTO: 1 % (ref 0–0.5)
INR PPP: 1.2 (ref 0.9–1.1)
LYMPHOCYTES # BLD: 0.8 K/UL (ref 0.8–3.5)
LYMPHOCYTES NFR BLD: 14 % (ref 12–49)
MCH RBC QN AUTO: 32.7 PG (ref 26–34)
MCHC RBC AUTO-ENTMCNC: 33.5 G/DL (ref 30–36.5)
MCV RBC AUTO: 97.7 FL (ref 80–99)
MONOCYTES # BLD: 0.9 K/UL (ref 0–1)
MONOCYTES NFR BLD: 15 % (ref 5–13)
NEUTS SEG # BLD: 3.8 K/UL (ref 1.8–8)
NEUTS SEG NFR BLD: 65 % (ref 32–75)
NRBC # BLD: 0 K/UL (ref 0–0.01)
NRBC BLD-RTO: 0 PER 100 WBC
PERFORMED BY, TECHID: ABNORMAL
PLATELET # BLD AUTO: 182 K/UL (ref 150–400)
PMV BLD AUTO: 9.6 FL (ref 8.9–12.9)
POTASSIUM SERPL-SCNC: 3.8 MMOL/L (ref 3.5–5.1)
PROTHROMBIN TIME: 15.1 SEC (ref 11.9–14.6)
RBC # BLD AUTO: 3.85 M/UL (ref 4.1–5.7)
SODIUM SERPL-SCNC: 136 MMOL/L (ref 136–145)
WBC # BLD AUTO: 5.8 K/UL (ref 4.1–11.1)

## 2022-08-10 PROCEDURE — 94761 N-INVAS EAR/PLS OXIMETRY MLT: CPT

## 2022-08-10 PROCEDURE — 85025 COMPLETE CBC W/AUTO DIFF WBC: CPT

## 2022-08-10 PROCEDURE — 77030041313 HC TY KYPH FRST FX MEDT -K1

## 2022-08-10 PROCEDURE — 74011250636 HC RX REV CODE- 250/636: Performed by: PHYSICIAN ASSISTANT

## 2022-08-10 PROCEDURE — 97530 THERAPEUTIC ACTIVITIES: CPT

## 2022-08-10 PROCEDURE — C1713 ANCHOR/SCREW BN/BN,TIS/BN: HCPCS

## 2022-08-10 PROCEDURE — 99153 MOD SED SAME PHYS/QHP EA: CPT

## 2022-08-10 PROCEDURE — 85610 PROTHROMBIN TIME: CPT

## 2022-08-10 PROCEDURE — 82962 GLUCOSE BLOOD TEST: CPT

## 2022-08-10 PROCEDURE — 94640 AIRWAY INHALATION TREATMENT: CPT

## 2022-08-10 PROCEDURE — 74011636637 HC RX REV CODE- 636/637: Performed by: HOSPITALIST

## 2022-08-10 PROCEDURE — 96376 TX/PRO/DX INJ SAME DRUG ADON: CPT

## 2022-08-10 PROCEDURE — 74011250636 HC RX REV CODE- 250/636: Performed by: STUDENT IN AN ORGANIZED HEALTH CARE EDUCATION/TRAINING PROGRAM

## 2022-08-10 PROCEDURE — 74011000250 HC RX REV CODE- 250: Performed by: HOSPITALIST

## 2022-08-10 PROCEDURE — G0378 HOSPITAL OBSERVATION PER HR: HCPCS

## 2022-08-10 PROCEDURE — 77030021783 HC SYS CEM DEL MEDT -D

## 2022-08-10 PROCEDURE — 65270000029 HC RM PRIVATE

## 2022-08-10 PROCEDURE — 74011250637 HC RX REV CODE- 250/637: Performed by: INTERNAL MEDICINE

## 2022-08-10 PROCEDURE — 99152 MOD SED SAME PHYS/QHP 5/>YRS: CPT

## 2022-08-10 PROCEDURE — 22514 PERQ VERTEBRAL AUGMENTATION: CPT

## 2022-08-10 PROCEDURE — 74011250637 HC RX REV CODE- 250/637: Performed by: PHYSICIAN ASSISTANT

## 2022-08-10 PROCEDURE — 74011250637 HC RX REV CODE- 250/637: Performed by: HOSPITALIST

## 2022-08-10 PROCEDURE — 74011000250 HC RX REV CODE- 250: Performed by: INTERNAL MEDICINE

## 2022-08-10 PROCEDURE — 80048 BASIC METABOLIC PNL TOTAL CA: CPT

## 2022-08-10 PROCEDURE — 77010033678 HC OXYGEN DAILY

## 2022-08-10 PROCEDURE — 36415 COLL VENOUS BLD VENIPUNCTURE: CPT

## 2022-08-10 PROCEDURE — 74011250636 HC RX REV CODE- 250/636: Performed by: INTERNAL MEDICINE

## 2022-08-10 RX ORDER — CEFAZOLIN SODIUM 1 G/3ML
2 INJECTION, POWDER, FOR SOLUTION INTRAMUSCULAR; INTRAVENOUS ONCE
Status: COMPLETED | OUTPATIENT
Start: 2022-08-10 | End: 2022-08-10

## 2022-08-10 RX ORDER — OXYCODONE HCL 10 MG/1
10 TABLET, FILM COATED, EXTENDED RELEASE ORAL EVERY 12 HOURS
Status: DISCONTINUED | OUTPATIENT
Start: 2022-08-10 | End: 2022-08-12

## 2022-08-10 RX ORDER — HYDROMORPHONE HYDROCHLORIDE 1 MG/ML
0.5 INJECTION, SOLUTION INTRAMUSCULAR; INTRAVENOUS; SUBCUTANEOUS
Status: DISCONTINUED | OUTPATIENT
Start: 2022-08-10 | End: 2022-08-10

## 2022-08-10 RX ORDER — OXYCODONE HYDROCHLORIDE 5 MG/1
5 TABLET ORAL
Status: DISCONTINUED | OUTPATIENT
Start: 2022-08-10 | End: 2022-08-17 | Stop reason: HOSPADM

## 2022-08-10 RX ORDER — KETOROLAC TROMETHAMINE 30 MG/ML
30 INJECTION, SOLUTION INTRAMUSCULAR; INTRAVENOUS AS NEEDED
Status: DISPENSED | OUTPATIENT
Start: 2022-08-10 | End: 2022-08-15

## 2022-08-10 RX ORDER — FENTANYL CITRATE 50 UG/ML
25-100 INJECTION, SOLUTION INTRAMUSCULAR; INTRAVENOUS
Status: DISCONTINUED | OUTPATIENT
Start: 2022-08-10 | End: 2022-08-17 | Stop reason: HOSPADM

## 2022-08-10 RX ORDER — HYDROMORPHONE HYDROCHLORIDE 1 MG/ML
0.5 INJECTION, SOLUTION INTRAMUSCULAR; INTRAVENOUS; SUBCUTANEOUS
Status: ACTIVE | OUTPATIENT
Start: 2022-08-10 | End: 2022-08-12

## 2022-08-10 RX ORDER — MIDAZOLAM HYDROCHLORIDE 1 MG/ML
.5-2 INJECTION, SOLUTION INTRAMUSCULAR; INTRAVENOUS
Status: DISCONTINUED | OUTPATIENT
Start: 2022-08-10 | End: 2022-08-17 | Stop reason: HOSPADM

## 2022-08-10 RX ORDER — ACETAMINOPHEN 500 MG
1000 TABLET ORAL EVERY 6 HOURS
Status: DISCONTINUED | OUTPATIENT
Start: 2022-08-10 | End: 2022-08-17 | Stop reason: HOSPADM

## 2022-08-10 RX ORDER — KETOROLAC TROMETHAMINE 30 MG/ML
30 INJECTION, SOLUTION INTRAMUSCULAR; INTRAVENOUS
Status: DISPENSED | OUTPATIENT
Start: 2022-08-10 | End: 2022-08-11

## 2022-08-10 RX ORDER — HYDROMORPHONE HYDROCHLORIDE 1 MG/ML
1 INJECTION, SOLUTION INTRAMUSCULAR; INTRAVENOUS; SUBCUTANEOUS
Status: DISCONTINUED | OUTPATIENT
Start: 2022-08-10 | End: 2022-08-10

## 2022-08-10 RX ORDER — OXYCODONE HYDROCHLORIDE 10 MG/1
10 TABLET ORAL
Status: DISCONTINUED | OUTPATIENT
Start: 2022-08-10 | End: 2022-08-10

## 2022-08-10 RX ADMIN — CITALOPRAM HYDROBROMIDE 40 MG: 20 TABLET ORAL at 09:48

## 2022-08-10 RX ADMIN — INSULIN LISPRO 4 UNITS: 100 INJECTION, SOLUTION INTRAVENOUS; SUBCUTANEOUS at 17:02

## 2022-08-10 RX ADMIN — HYDROMORPHONE HYDROCHLORIDE 1 MG: 1 INJECTION, SOLUTION INTRAMUSCULAR; INTRAVENOUS; SUBCUTANEOUS at 06:20

## 2022-08-10 RX ADMIN — Medication 1 TABLET: at 09:49

## 2022-08-10 RX ADMIN — PAROXETINE HYDROCHLORIDE 40 MG: 20 TABLET, FILM COATED ORAL at 09:48

## 2022-08-10 RX ADMIN — DOCUSATE SODIUM 100 MG: 100 CAPSULE, LIQUID FILLED ORAL at 21:06

## 2022-08-10 RX ADMIN — OXYCODONE HYDROCHLORIDE 10 MG: 10 TABLET, FILM COATED, EXTENDED RELEASE ORAL at 21:06

## 2022-08-10 RX ADMIN — MIDAZOLAM HYDROCHLORIDE 1 MG: 1 INJECTION, SOLUTION INTRAMUSCULAR; INTRAVENOUS at 12:18

## 2022-08-10 RX ADMIN — ACETAMINOPHEN 1000 MG: 500 TABLET, FILM COATED ORAL at 23:51

## 2022-08-10 RX ADMIN — FENTANYL CITRATE 50 MCG: 0.05 INJECTION, SOLUTION INTRAMUSCULAR; INTRAVENOUS at 12:18

## 2022-08-10 RX ADMIN — IPRATROPIUM BROMIDE AND ALBUTEROL SULFATE 3 ML: 2.5; .5 SOLUTION RESPIRATORY (INHALATION) at 01:25

## 2022-08-10 RX ADMIN — MIDAZOLAM HYDROCHLORIDE 1 MG: 1 INJECTION, SOLUTION INTRAMUSCULAR; INTRAVENOUS at 12:26

## 2022-08-10 RX ADMIN — RANOLAZINE 1000 MG: 500 TABLET, FILM COATED, EXTENDED RELEASE ORAL at 09:48

## 2022-08-10 RX ADMIN — ACETAMINOPHEN 1000 MG: 500 TABLET, FILM COATED ORAL at 09:53

## 2022-08-10 RX ADMIN — Medication 1 TABLET: at 17:02

## 2022-08-10 RX ADMIN — SODIUM CHLORIDE, PRESERVATIVE FREE 10 ML: 5 INJECTION INTRAVENOUS at 04:47

## 2022-08-10 RX ADMIN — Medication 1 CAPSULE: at 09:48

## 2022-08-10 RX ADMIN — ACETAMINOPHEN 1000 MG: 500 TABLET, FILM COATED ORAL at 17:02

## 2022-08-10 RX ADMIN — FENTANYL CITRATE 50 MCG: 0.05 INJECTION, SOLUTION INTRAMUSCULAR; INTRAVENOUS at 12:26

## 2022-08-10 RX ADMIN — KETOROLAC TROMETHAMINE 30 MG: 30 INJECTION, SOLUTION INTRAMUSCULAR; INTRAVENOUS at 18:51

## 2022-08-10 RX ADMIN — ATORVASTATIN CALCIUM 80 MG: 40 TABLET, FILM COATED ORAL at 09:48

## 2022-08-10 RX ADMIN — DOCUSATE SODIUM 100 MG: 100 CAPSULE, LIQUID FILLED ORAL at 09:48

## 2022-08-10 RX ADMIN — HYDROMORPHONE HYDROCHLORIDE 1 MG: 1 INJECTION, SOLUTION INTRAMUSCULAR; INTRAVENOUS; SUBCUTANEOUS at 02:16

## 2022-08-10 RX ADMIN — Medication 1000 UNITS: at 09:49

## 2022-08-10 RX ADMIN — AMLODIPINE BESYLATE 10 MG: 5 TABLET ORAL at 09:48

## 2022-08-10 RX ADMIN — FLUTICASONE PROPIONATE 2 SPRAY: 50 SPRAY, METERED NASAL at 09:56

## 2022-08-10 RX ADMIN — MIDAZOLAM HYDROCHLORIDE 1 MG: 1 INJECTION, SOLUTION INTRAMUSCULAR; INTRAVENOUS at 12:21

## 2022-08-10 RX ADMIN — KETOROLAC TROMETHAMINE 30 MG: 30 INJECTION, SOLUTION INTRAMUSCULAR; INTRAVENOUS at 12:18

## 2022-08-10 RX ADMIN — IPRATROPIUM BROMIDE AND ALBUTEROL SULFATE 3 ML: 2.5; .5 SOLUTION RESPIRATORY (INHALATION) at 06:10

## 2022-08-10 RX ADMIN — SODIUM CHLORIDE, PRESERVATIVE FREE 10 ML: 5 INJECTION INTRAVENOUS at 17:03

## 2022-08-10 RX ADMIN — CYANOCOBALAMIN TAB 1000 MCG 1000 MCG: 1000 TAB at 09:48

## 2022-08-10 RX ADMIN — CEFAZOLIN SODIUM 2 G: 1 INJECTION, POWDER, FOR SOLUTION INTRAMUSCULAR; INTRAVENOUS at 12:15

## 2022-08-10 RX ADMIN — INSULIN GLARGINE 16 UNITS: 100 INJECTION, SOLUTION SUBCUTANEOUS at 22:00

## 2022-08-10 RX ADMIN — SODIUM CHLORIDE, PRESERVATIVE FREE 10 ML: 5 INJECTION INTRAVENOUS at 21:05

## 2022-08-10 RX ADMIN — FENTANYL CITRATE 50 MCG: 0.05 INJECTION, SOLUTION INTRAMUSCULAR; INTRAVENOUS at 12:21

## 2022-08-10 RX ADMIN — IPRATROPIUM BROMIDE AND ALBUTEROL SULFATE 3 ML: 2.5; .5 SOLUTION RESPIRATORY (INHALATION) at 14:25

## 2022-08-10 RX ADMIN — IPRATROPIUM BROMIDE AND ALBUTEROL SULFATE 3 ML: 2.5; .5 SOLUTION RESPIRATORY (INHALATION) at 19:25

## 2022-08-10 RX ADMIN — RANOLAZINE 1000 MG: 500 TABLET, FILM COATED, EXTENDED RELEASE ORAL at 21:06
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
## 2022-08-10 NOTE — PROGRESS NOTES
Problem: Pressure Injury - Risk of  Goal: *Prevention of pressure injury  Description: Document Sea Scale and appropriate interventions in the flowsheet. Outcome: Progressing Towards Goal  Note: Pressure Injury Interventions:  Sensory Interventions: Keep linens dry and wrinkle-free, Avoid rigorous massage over bony prominences, Assess need for specialty bed, Assess changes in LOC, Pad between skin to skin, Monitor skin under medical devices, Minimize linen layers    Moisture Interventions: Minimize layers, Maintain skin hydration (lotion/cream), Moisture barrier, Apply protective barrier, creams and emollients, Assess need for specialty bed    Activity Interventions: PT/OT evaluation, Increase time out of bed, Pressure redistribution bed/mattress(bed type)    Mobility Interventions: HOB 30 degrees or less, Pressure redistribution bed/mattress (bed type), PT/OT evaluation    Nutrition Interventions: Document food/fluid/supplement intake, Discuss nutritional consult with provider, Offer support with meals,snacks and hydration    Friction and Shear Interventions: Minimize layers, HOB 30 degrees or less, Apply protective barrier, creams and emollients                Problem: Falls - Risk of  Goal: *Absence of Falls  Description: Document Geri Fall Risk and appropriate interventions in the flowsheet.   Outcome: Progressing Towards Goal  Note: Fall Risk Interventions:  Mobility Interventions: OT consult for ADLs, Patient to call before getting OOB, PT Consult for mobility concerns, PT Consult for assist device competence, Bed/chair exit alarm    Mentation Interventions: Evaluate medications/consider consulting pharmacy, Bed/chair exit alarm    Medication Interventions: Evaluate medications/consider consulting pharmacy, Patient to call before getting OOB, Teach patient to arise slowly, Bed/chair exit alarm    Elimination Interventions: Bed/chair exit alarm, Call light in reach, Patient to call for help with toileting needs, Stay With Me (per policy), Toilet paper/wipes in reach              Problem: Diabetes Self-Management  Goal: *Disease process and treatment process  Description: Define diabetes and identify own type of diabetes; list 3 options for treating diabetes. Outcome: Progressing Towards Goal  Goal: *Incorporating nutritional management into lifestyle  Description: Describe effect of type, amount and timing of food on blood glucose; list 3 methods for planning meals. Outcome: Progressing Towards Goal  Goal: *Incorporating physical activity into lifestyle  Description: State effect of exercise on blood glucose levels. Outcome: Progressing Towards Goal  Goal: *Developing strategies to promote health/change behavior  Description: Define the ABC's of diabetes; identify appropriate screenings, schedule and personal plan for screenings. Outcome: Progressing Towards Goal  Goal: *Using medications safely  Description: State effect of diabetes medications on diabetes; name diabetes medication taking, action and side effects. Outcome: Progressing Towards Goal  Goal: *Monitoring blood glucose, interpreting and using results  Description: Identify recommended blood glucose targets  and personal targets. Outcome: Progressing Towards Goal  Goal: *Prevention, detection, treatment of acute complications  Description: List symptoms of hyper- and hypoglycemia; describe how to treat low blood sugar and actions for lowering  high blood glucose level. Outcome: Progressing Towards Goal  Goal: *Prevention, detection and treatment of chronic complications  Description: Define the natural course of diabetes and describe the relationship of blood glucose levels to long term complications of diabetes.   Outcome: Progressing Towards Goal  Goal: *Developing strategies to address psychosocial issues  Description: Describe feelings about living with diabetes; identify support needed and support network  Outcome: Progressing Towards Goal  Goal: *Insulin pump training  Outcome: Progressing Towards Goal  Goal: *Sick day guidelines  Outcome: Progressing Towards Goal  Goal: *Patient Specific Goal (EDIT GOAL, INSERT TEXT)  Outcome: Progressing Towards Goal
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
## 2022-08-10 NOTE — PROGRESS NOTES
Progress Note      8/10/2022 2:38 PM  NAME: Betsy Alejo   MRN:  [id number]   Admit Diagnosis: Dizziness [R42]  Syncope [R55]  Acute hypoxemic respiratory failure ([facility] [J96.01]      Subjective:   Patient is a still having pain in his back and upper chest area. Patient had a some chest discomfort and still has a significant shortness of breath. Patient complains of pain in epigastric area and lower chest and he had a blood work done and troponin I is 330. Discussed with the nurse. Review of Systems:    Symptom Y/N Comments  Symptom Y/N Comments   Fever/Chills n   Chest Pain y Somewhat better   Poor Appetite    Edema     Cough    Abdominal Pain n    Sputum    Joint Pain     SOB/OSCAR n   Pruritis/Rash     Nausea/vomit    Back pain y    Diarrhea         Constipation           Could NOT obtain due to:      Objective:        Physical Exam:    Last 24hrs VS reviewed since prior progress note. Most recent are:    Visit Vitals  /68   Pulse 67   Temp 98.1 °F (36.7 °C)   Resp 18   Ht 5' 8\" (1.727 m)   Wt 132 kg (291 lb 0.1 oz)   SpO2 94%   BMI 44.25 kg/m²       Intake/Output Summary (Last 24 hours) at 8/10/2022 1156  Last data filed at 8/10/2022 0537  Gross per 24 hour   Intake 200 ml   Output 1050 ml   Net -850 ml          General Appearance: Well developed, well nourished, alert & oriented x 3,    no acute distress. Ears/Nose/Mouth/Throat: Hearing grossly normal.  Neck: Supple. Chest: Patient has some wheezing. Cardiovascular: Regular rate and rhythm, S1,S2 normal, no murmur. Abdomen: Soft, non-tender, bowel sounds are active. Extremities: No edema bilaterally. Skin: Warm and dry. []         Post-cath site without hematoma, bruit, tenderness, or thrill. Distal pulses intact.     PMH/SH reviewed - no change compared to H&P    Data Review    Telemetry: normal sinus rhythm     EKG:   []  No new EKG for review    Lab Data Personally Reviewed:    Recent Labs     08/10/22  0804 08/09/22  0649   WBC 5.8 6.3   HGB 12.6 12.3   HCT 37.6 36.1*    177       Recent Labs     08/10/22  0804   INR 1.2*   PTP 15.1*        Recent Labs     08/10/22  0804 08/09/22  0649 08/08/22  0645    136 134*   K 3.8 3.6 3.5    103 101   CO2 26 25 26   BUN 15 16 17   CREA 0.90 1.00 1.00   * 130* 148*   CA 9.0 8.4* 8.7       No results for input(s): CPK, CKNDX, TROIQ in the last 72 hours. No lab exists for component: CPKMB  No results found for: CHOL, CHOLX, CHLST, CHOLV, HDL, HDLP, LDL, LDLC, DLDLP, TGLX, TRIGL, TRIGP, CHHD, CHHDX    No results for input(s): AP, TBIL, TP, ALB, GLOB, GGT, AML, LPSE in the last 72 hours. No lab exists for component: SGOT, GPT, AMYP, HLPSE    No results for input(s): PH, PCO2, PO2 in the last 72 hours.     Medications Personally Reviewed:    Current Facility-Administered Medications   Medication Dose Route Frequency    acetaminophen (TYLENOL) tablet 1,000 mg  1,000 mg Oral Q6H    oxyCODONE IR (ROXICODONE) tablet 5 mg  5 mg Oral Q4H PRN    ketorolac (TORADOL) injection 30 mg  30 mg IntraVENous Q8H PRN    oxyCODONE ER (OxyCONTIN) tablet 10 mg  10 mg Oral Q12H    HYDROmorphone (DILAUDID) syringe 0.5 mg  0.5 mg IntraVENous Q4H PRN    albuterol-ipratropium (DUO-NEB) 2.5 MG-0.5 MG/3 ML  3 mL Nebulization Q6H RT    nitroglycerin (NITROSTAT) tablet 0.4 mg  0.4 mg SubLINGual PRN    atorvastatin (LIPITOR) tablet 80 mg  80 mg Oral DAILY    citalopram (CELEXA) tablet 40 mg  40 mg Oral DAILY    PARoxetine (PAXIL) tablet 40 mg  40 mg Oral DAILY    zolpidem (AMBIEN) tablet 5 mg  5 mg Oral QHS PRN    [Held by provider] aspirin chewable tablet 81 mg  81 mg Oral DAILY    cholecalciferol (VITAMIN D3) (1000 Units /25 mcg) tablet 1,000 Units  1,000 Units Oral DAILY    cyanocobalamin tablet 1,000 mcg  1,000 mcg Oral DAILY    vitamin B complex capsule 1 Capsule  1 Capsule Oral DAILY    fluticasone propionate (FLONASE) 50 mcg/actuation nasal spray 2 Spray  2 Spray Both Nostrils DAILY    albuterol (PROVENTIL HFA, VENTOLIN HFA, PROAIR HFA) inhaler 2 Puff  2 Puff Inhalation Q4H PRN    amLODIPine (NORVASC) tablet 10 mg  10 mg Oral DAILY    insulin glargine (LANTUS) injection 16 Units  16 Units SubCUTAneous QHS    ranolazine ER (RANEXA) tablet 1,000 mg  1,000 mg Oral BID    calcium-vitamin D 600 mg-5 mcg (200 unit) per tablet 1 Tablet  1 Tablet Oral BID WITH MEALS    sodium chloride (NS) flush 5-40 mL  5-40 mL IntraVENous Q8H    sodium chloride (NS) flush 5-40 mL  5-40 mL IntraVENous PRN    docusate sodium (COLACE) capsule 100 mg  100 mg Oral BID    insulin lispro (HUMALOG) injection   SubCUTAneous AC&HS    glucose chewable tablet 16 g  4 Tablet Oral PRN    glucagon (GLUCAGEN) injection 1 mg  1 mg IntraMUSCular PRN    dextrose 10% infusion 0-250 mL  0-250 mL IntraVENous PRN    [Held by provider] enoxaparin (LOVENOX) injection 30 mg  30 mg SubCUTAneous Q12H    polyethylene glycol (MIRALAX) packet 17 g  17 g Oral DAILY PRN             Problem List:   Echocardiograph results explained and patient has a dilated left ventricle with severe left ventricular systolic dysfunction. Probably syncope related to alcohol intoxication. S/p ICD. Diabetes mellitus type 2. Fracture of the spine. Patient experienced epigastric/lower chest pain and her troponin I is minimally elevated. I am not convinced that this is a presentation of acute MI. Assessment/Plan:   From cardiac viewpoint I will continue present care. I am not completely convinced that his this chest pain is a presentation of acute coronary syndrome however because his troponin I is minimally elevated I will repeat again troponin I tomorrow. I we will follow pulmonary and orthopedic recommendations. 2.           [x]       High complexity decision making was performed in this patient at high risk for decompensation with multiple organ involvement.     Alessandra Rutledge MD

## 2022-08-10 NOTE — PROGRESS NOTES
CM reviewed chart. Patient's discharge disposition is IRF. Referral has been sent to [facility], who has accepted. CM will continue to follow.

## 2022-08-10 NOTE — PROGRESS NOTES
PHYSICAL THERAPY TREATMENT  Patient: Kate Bird (73 y.o. male)  Date: 8/10/2022  Diagnosis: Dizziness [R42]  Syncope [R55]  Acute hypoxemic respiratory failure ([facility] [J96.01] <principal problem not specified>      Precautions:    Chart, physical therapy assessment, plan of care and goals were reviewed. ASSESSMENT  Patient continues with skilled PT services and is progressing towards goals. Patient supine in bed upon approach and agreed to therapy session today. Patient was CGA with additional time for bed mobility using log rolling technique, supine>sit, and scooting EOB. Patient demonstrated good unsupported sitting balance while EOB. Patient then performed STS to RW at [place] [address]. Patient then ambulated 5 feet to bedside recliner chair patient had steady step through gait with no LOB or knee buckling. Patient then sat in recliner chair at [place] [address]. Patient had 9/10 pain in back and chest and was slow with all mobility today, 2/2 to pain. Patient then left seated in recliner chair with OT entering room to start OT session. Current Level of Function Impacting Discharge (mobility/balance): impaired balance, pain with mobility, general weakness    Other factors to consider for discharge: PLOF, assistance at home, level of deficits. PLAN :  Patient continues to benefit from skilled intervention to address the above impairments. Continue treatment per established plan of care. to address goals. Recommendation for discharge: (in order for the patient to meet his/her long term goals)  [address] [facility] [place],[address]     This discharge recommendation:  Has been made in collaboration with the attending provider and/or case management    IF patient discharges home will need the following DME: gait belt and rolling walker       SUBJECTIVE:   Patient stated I didn't sleep a wink last night.     OBJECTIVE DATA SUMMARY:   Critical Behavior:  Neurologic State: Alert  Orientation Level: Oriented X4  Cognition: Follows commands     Functional Mobility Training:  Bed Mobility:  Rolling: Contact guard assistance; Additional time  Supine to Sit: Contact guard assistance; Additional time  Scooting: Contact guard assistance  Transfers:  Sit to Stand: Contact guard assistance  Stand to Sit: Contact guard assistance  Balance:  Sitting: Intact; Without support  Sitting - Static: Good (unsupported)  Standing: Impaired; With support  Standing - Static: Good;Constant support  Standing - Dynamic : Fair;Constant support  Ambulation/Gait Training:  Distance (ft): 5 Feet (ft)  Assistive Device: Gait belt;Walker, rolling  Ambulation - Level of Assistance: Contact guard assistance  Speed/Gladis: Slow;Shuffled  Step Length: Right shortened;Left shortened  Pain Ratin/10 in chest and back    Activity Tolerance:   Good  Please refer to the flowsheet for vital signs taken during this treatment. After treatment patient left in no apparent distress:   Bed returned to lowest position, Sitting in chair and Call bell within reach    COMMUNICATION/COLLABORATION:   The patients plan of care was discussed with: Registered nurse. Problem: Mobility Impaired (Adult and Pediatric)  Goal: *Acute Goals and Plan of Care (Insert Text)  Description: Physical Therapy Goals  Initiated 22  1)  I with HEP in 7 days to improve overall functional mobility. 2)  Bed mobility with min A in 7 days to prevent skin breakdown. 3)  Pt to perform stand pivot transfer from bed to chair with CGA in 7 days. 3)  Pt to amb 50ft with LRAD and CGA in 7 days    Pt. Goal:  Pt to be able to walk safely without falls.      Outcome: Progressing Towards Goal       Cristobal Edward PTA   Time Calculation: 26 mins

## 2022-08-10 NOTE — PROGRESS NOTES
OCCUPATIONAL THERAPY TREATMENT  Patient: Tatum Rogers (73 y.o. male)  Date: 8/10/2022  Diagnosis: Dizziness [R42]  Syncope [R55]  [facility] hypoxemic respiratory failure [facility] [J96.01] <principal problem not specified>      Precautions:    Chart, occupational therapy assessment, plan of care, and goals were reviewed. ASSESSMENT  Patient continues with skilled OT services and is progressing towards goals. Upon BROWN arrival, pt sitting in recliner and agreeable to tx session. Pt completed seated oral hygiene, face washing, and hair brushing with setup A. Pt completed sit>stand from chair with CGA and ambulated to EOB. Pt returned to supine with CGA/Wendy and rolled onto back. Pt required increased time throughout session due to pain and rest breaks. Pt left semi supine in bed with call bell within reach and needs met. Will continue to follow pt throughout remainder of stay and progress towards OT goals. Recommending IRF at discharge when medically appropriate. Other factors to consider for discharge: family/social support, DME, time since onset, severity of deficits, decline from functional baseline         PLAN :  Patient continues to benefit from skilled intervention to address the above impairments. Continue treatment per established plan of care. to address goals. Recommendation for discharge: (in order for the patient to meet his/her long term goals)  [address]     This discharge recommendation:  Has been made in collaboration with the attending provider and/or case management    IF patient discharges home will need the following DME: TBD       SUBJECTIVE:   Patient stated I should get back in the bed before they come get my for surgery.     OBJECTIVE DATA SUMMARY:   Cognitive/Behavioral Status:  Neurologic State: Alert  Orientation Level: Oriented X4  Cognition: Follows commands    Functional Mobility and Transfers for ADLs:  Bed Mobility:  Rolling: Contact guard assistance; Additional time  Sit to Supine: Contact guard assistance;Minimum assistance  Scooting: Contact guard assistance    Transfers:  Sit to Stand: Contact guard assistance     Bed to Chair: Contact guard assistance    Balance:  Sitting: Intact; Without support  Sitting - Static: Good (unsupported)  Sitting - Dynamic: Good (unsupported)  Standing: Impaired; With support  Standing - Static: Good;Constant support  Standing - Dynamic : Fair;Constant support    ADL Intervention:  Grooming  Position Performed: Seated in chair  Washing Face: Set-up  Brushing Teeth: Set-up  Brushing/Combing Hair: Set-up    Pain:  9-10/10 back pain    Activity Tolerance:   Fair, requires rest breaks, and observed SOB with activity  Please refer to the flowsheet for vital signs taken during this treatment. After treatment patient left in no apparent distress:   Bed returned to lowest position, Supine in bed and Call bell within reach    COMMUNICATION/COLLABORATION:   The patients plan of care was discussed with: Physical therapy assistant and Registered nurse. Partial cotreat with PT for increased safety for pt and clinician.     KEVIN Richter  Time Calculation: 32 mins    Problem: Self Care Deficits Care Plan (Adult)  Goal: *Acute Goals and Plan of Care (Insert Text)  Description: Pt stated goal \"to get out of bed\"  Pt will be Mod I sup <> sit in prep for EOB ADLs  Pt will be Mod I grooming standing sink side LRAD  Pt will be Mod I UB dressing sitting EOB/long sit   Pt will be Mod I LE dressing sitting EOB/long sit using AE PRN  Pt will be Mod I sit <>  prep for toileting LRAD  Pt will be Mod I toileting/toilet transfer/cloth mgmt LRAD  Pt will be IND following UE HEP in prep for self care tasks      Outcome: Progressing Towards Goal

## 2022-08-11 LAB
ANION GAP SERPL CALC-SCNC: 7 MMOL/L (ref 5–15)
BUN SERPL-MCNC: 19 MG/DL (ref 6–20)
BUN/CREAT SERPL: 20 (ref 12–20)
CA-I BLD-MCNC: 8.8 MG/DL (ref 8.5–10.1)
CHLORIDE SERPL-SCNC: 106 MMOL/L (ref 97–108)
CO2 SERPL-SCNC: 25 MMOL/L (ref 21–32)
CREAT SERPL-MCNC: 0.97 MG/DL (ref 0.7–1.3)
ERYTHROCYTE [DISTWIDTH] IN BLOOD BY AUTOMATED COUNT: 13.3 % (ref 11.5–14.5)
GLUCOSE BLD STRIP.AUTO-MCNC: 112 MG/DL (ref 65–117)
GLUCOSE BLD STRIP.AUTO-MCNC: 156 MG/DL (ref 65–117)
GLUCOSE BLD STRIP.AUTO-MCNC: 209 MG/DL (ref 65–117)
GLUCOSE BLD STRIP.AUTO-MCNC: 82 MG/DL (ref 65–117)
GLUCOSE SERPL-MCNC: 118 MG/DL (ref 65–100)
HCT VFR BLD AUTO: 37.5 % (ref 36.6–50.3)
HGB BLD-MCNC: 12.6 G/DL (ref 12.1–17)
MCH RBC QN AUTO: 33.1 PG (ref 26–34)
MCHC RBC AUTO-ENTMCNC: 33.6 G/DL (ref 30–36.5)
MCV RBC AUTO: 98.4 FL (ref 80–99)
NRBC # BLD: 0 K/UL (ref 0–0.01)
NRBC BLD-RTO: 0 PER 100 WBC
PERFORMED BY, TECHID: ABNORMAL
PERFORMED BY, TECHID: ABNORMAL
PERFORMED BY, TECHID: NORMAL
PERFORMED BY, TECHID: NORMAL
PLATELET # BLD AUTO: 200 K/UL (ref 150–400)
PMV BLD AUTO: 9.6 FL (ref 8.9–12.9)
POTASSIUM SERPL-SCNC: 3.8 MMOL/L (ref 3.5–5.1)
RBC # BLD AUTO: 3.81 M/UL (ref 4.1–5.7)
SODIUM SERPL-SCNC: 138 MMOL/L (ref 136–145)
WBC # BLD AUTO: 5.7 K/UL (ref 4.1–11.1)

## 2022-08-11 PROCEDURE — 74011250637 HC RX REV CODE- 250/637: Performed by: INTERNAL MEDICINE

## 2022-08-11 PROCEDURE — 85027 COMPLETE CBC AUTOMATED: CPT

## 2022-08-11 PROCEDURE — 74011250637 HC RX REV CODE- 250/637: Performed by: PHYSICIAN ASSISTANT

## 2022-08-11 PROCEDURE — 74011636637 HC RX REV CODE- 636/637: Performed by: HOSPITALIST

## 2022-08-11 PROCEDURE — 65270000029 HC RM PRIVATE

## 2022-08-11 PROCEDURE — 74011000250 HC RX REV CODE- 250: Performed by: INTERNAL MEDICINE

## 2022-08-11 PROCEDURE — 80048 BASIC METABOLIC PNL TOTAL CA: CPT

## 2022-08-11 PROCEDURE — 36415 COLL VENOUS BLD VENIPUNCTURE: CPT

## 2022-08-11 PROCEDURE — 74011000250 HC RX REV CODE- 250: Performed by: HOSPITALIST

## 2022-08-11 PROCEDURE — 94660 CPAP INITIATION&MGMT: CPT

## 2022-08-11 PROCEDURE — 94640 AIRWAY INHALATION TREATMENT: CPT

## 2022-08-11 PROCEDURE — 82962 GLUCOSE BLOOD TEST: CPT

## 2022-08-11 PROCEDURE — 74011250637 HC RX REV CODE- 250/637: Performed by: HOSPITALIST

## 2022-08-11 RX ORDER — BUDESONIDE AND FORMOTEROL FUMARATE DIHYDRATE 160; 4.5 UG/1; UG/1
2 AEROSOL RESPIRATORY (INHALATION)
Status: DISCONTINUED | OUTPATIENT
Start: 2022-08-11 | End: 2022-08-17 | Stop reason: HOSPADM

## 2022-08-11 RX ADMIN — ATORVASTATIN CALCIUM 80 MG: 40 TABLET, FILM COATED ORAL at 09:34

## 2022-08-11 RX ADMIN — IPRATROPIUM BROMIDE AND ALBUTEROL SULFATE 3 ML: 2.5; .5 SOLUTION RESPIRATORY (INHALATION) at 07:34

## 2022-08-11 RX ADMIN — BUDESONIDE AND FORMOTEROL FUMARATE DIHYDRATE 2 PUFF: 160; 4.5 AEROSOL RESPIRATORY (INHALATION) at 21:06

## 2022-08-11 RX ADMIN — Medication 1 TABLET: at 09:34

## 2022-08-11 RX ADMIN — DOCUSATE SODIUM 100 MG: 100 CAPSULE, LIQUID FILLED ORAL at 22:27

## 2022-08-11 RX ADMIN — Medication 1000 UNITS: at 09:35

## 2022-08-11 RX ADMIN — FLUTICASONE PROPIONATE 2 SPRAY: 50 SPRAY, METERED NASAL at 09:35

## 2022-08-11 RX ADMIN — PAROXETINE HYDROCHLORIDE 40 MG: 20 TABLET, FILM COATED ORAL at 09:35

## 2022-08-11 RX ADMIN — IPRATROPIUM BROMIDE AND ALBUTEROL SULFATE 3 ML: 2.5; .5 SOLUTION RESPIRATORY (INHALATION) at 21:05

## 2022-08-11 RX ADMIN — INSULIN LISPRO 3 UNITS: 100 INJECTION, SOLUTION INTRAVENOUS; SUBCUTANEOUS at 13:26

## 2022-08-11 RX ADMIN — SODIUM CHLORIDE, PRESERVATIVE FREE 10 ML: 5 INJECTION INTRAVENOUS at 05:00

## 2022-08-11 RX ADMIN — IPRATROPIUM BROMIDE AND ALBUTEROL SULFATE 3 ML: 2.5; .5 SOLUTION RESPIRATORY (INHALATION) at 01:11

## 2022-08-11 RX ADMIN — AMLODIPINE BESYLATE 10 MG: 5 TABLET ORAL at 09:34

## 2022-08-11 RX ADMIN — ACETAMINOPHEN 1000 MG: 500 TABLET, FILM COATED ORAL at 13:26

## 2022-08-11 RX ADMIN — INSULIN GLARGINE 16 UNITS: 100 INJECTION, SOLUTION SUBCUTANEOUS at 22:00

## 2022-08-11 RX ADMIN — CITALOPRAM HYDROBROMIDE 40 MG: 20 TABLET ORAL at 09:34

## 2022-08-11 RX ADMIN — RANOLAZINE 1000 MG: 500 TABLET, FILM COATED, EXTENDED RELEASE ORAL at 22:27

## 2022-08-11 RX ADMIN — Medication 1 CAPSULE: at 09:35

## 2022-08-11 RX ADMIN — RANOLAZINE 1000 MG: 500 TABLET, FILM COATED, EXTENDED RELEASE ORAL at 09:35

## 2022-08-11 RX ADMIN — SODIUM CHLORIDE, PRESERVATIVE FREE 10 ML: 5 INJECTION INTRAVENOUS at 22:28

## 2022-08-11 RX ADMIN — IPRATROPIUM BROMIDE AND ALBUTEROL SULFATE 3 ML: 2.5; .5 SOLUTION RESPIRATORY (INHALATION) at 14:25

## 2022-08-11 RX ADMIN — CYANOCOBALAMIN TAB 1000 MCG 1000 MCG: 1000 TAB at 09:35

## 2022-08-11 RX ADMIN — SODIUM CHLORIDE, PRESERVATIVE FREE 10 ML: 5 INJECTION INTRAVENOUS at 16:47

## 2022-08-11 RX ADMIN — ACETAMINOPHEN 1000 MG: 500 TABLET, FILM COATED ORAL at 17:00

## 2022-08-11 RX ADMIN — INSULIN LISPRO 2 UNITS: 100 INJECTION, SOLUTION INTRAVENOUS; SUBCUTANEOUS at 22:00

## 2022-08-11 RX ADMIN — Medication 1 TABLET: at 16:44

## 2022-08-11 RX ADMIN — OXYCODONE HYDROCHLORIDE 10 MG: 10 TABLET, FILM COATED, EXTENDED RELEASE ORAL at 22:27

## 2022-08-11 RX ADMIN — OXYCODONE HYDROCHLORIDE 10 MG: 10 TABLET, FILM COATED, EXTENDED RELEASE ORAL at 09:35

## 2022-08-11 RX ADMIN — DOCUSATE SODIUM 100 MG: 100 CAPSULE, LIQUID FILLED ORAL at 09:35

## 2022-08-11 RX ADMIN — ACETAMINOPHEN 1000 MG: 500 TABLET, FILM COATED ORAL at 05:08

## 2022-08-11 RX ADMIN — OXYCODONE 5 MG: 5 TABLET ORAL at 13:30

## 2022-08-11 NOTE — PROGRESS NOTES
OT re-assessment attempted at 1350, pt seen sleeping in supine, currently on CPAP mask. Able to arouse pt with verbal stimuli, however pt requesting to rest at this time requesting therapy return tomorrow to work with him. Will continue to follow and re-attempt OT at a later time. Thank you.
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
## 2022-08-11 NOTE — PROGRESS NOTES
PT RA attempted at 1351 however pt declined OOB mobility at this time due to fatigue, currently on CPAP. Will continue to follow patient and attempt PT at a later time. Thank you.

## 2022-08-11 NOTE — PROGRESS NOTES
WERO was notified by liaison at [facility] that patient's insurance requested a peer to peer review with MD. Dr. Ashok Andrews at [facility] will be completing the review by 5 PM today. CM contacted patient's daughter, Conchita Nicholson ([phone]) to notify her of this update, and get choice for SNF. Ms. Radha Yoon gave choice for (1)[facility] and (2) [facility]. WERO will continue to follow.

## 2022-08-11 NOTE — PROGRESS NOTES
CM notified by [facility] liaison that patient's insurance denied auth for IRF. CM sent referrals for SNF via [facility]. CM will continue to follow.

## 2022-08-11 NOTE — PROGRESS NOTES
Problem: Pressure Injury - Risk of  Goal: *Prevention of pressure injury  Description: Document Sea Scale and appropriate interventions in the flowsheet. Outcome: Progressing Towards Goal  Note: Pressure Injury Interventions:  Sensory Interventions: Assess changes in LOC, Maintain/enhance activity level, Keep linens dry and wrinkle-free, Minimize linen layers    Moisture Interventions: Absorbent underpads, Internal/External urinary devices, Minimize layers    Activity Interventions: PT/OT evaluation    Mobility Interventions: HOB 30 degrees or less, Turn and reposition approx. every two hours(pillow and wedges), PT/OT evaluation    Nutrition Interventions: Document food/fluid/supplement intake    Friction and Shear Interventions: Minimize layers                Problem: Falls - Risk of  Goal: *Absence of Falls  Description: Document Geri Fall Risk and appropriate interventions in the flowsheet. Outcome: Progressing Towards Goal  Note: Fall Risk Interventions:  Mobility Interventions: Bed/chair exit alarm    Mentation Interventions: Bed/chair exit alarm, Adequate sleep, hydration, pain control    Medication Interventions: Bed/chair exit alarm    Elimination Interventions: Bed/chair exit alarm, Call light in reach, Toileting schedule/hourly rounds              Problem: Discharge Planning  Goal: *Discharge to safe environment  Outcome: Progressing Towards Goal  Goal: *Knowledge of medication management  Outcome: Progressing Towards Goal  Goal: *Knowledge of discharge instructions  Outcome: Progressing Towards Goal     Problem: Diabetes Self-Management  Goal: *Disease process and treatment process  Description: Define diabetes and identify own type of diabetes; list 3 options for treating diabetes.   Outcome: Progressing Towards Goal  Goal: *Incorporating nutritional management into lifestyle  Description: Describe effect of type, amount and timing of food on blood glucose; list 3 methods for planning meals. Outcome: Progressing Towards Goal  Goal: *Incorporating physical activity into lifestyle  Description: State effect of exercise on blood glucose levels. Outcome: Progressing Towards Goal  Goal: *Developing strategies to promote health/change behavior  Description: Define the ABC's of diabetes; identify appropriate screenings, schedule and personal plan for screenings. Outcome: Progressing Towards Goal  Goal: *Using medications safely  Description: State effect of diabetes medications on diabetes; name diabetes medication taking, action and side effects. Outcome: Progressing Towards Goal  Goal: *Monitoring blood glucose, interpreting and using results  Description: Identify recommended blood glucose targets  and personal targets. Outcome: Progressing Towards Goal  Goal: *Prevention, detection, treatment of acute complications  Description: List symptoms of hyper- and hypoglycemia; describe how to treat low blood sugar and actions for lowering  high blood glucose level. Outcome: Progressing Towards Goal  Goal: *Prevention, detection and treatment of chronic complications  Description: Define the natural course of diabetes and describe the relationship of blood glucose levels to long term complications of diabetes.   Outcome: Progressing Towards Goal  Goal: *Developing strategies to address psychosocial issues  Description: Describe feelings about living with diabetes; identify support needed and support network  Outcome: Progressing Towards Goal  Goal: *Insulin pump training  Outcome: Progressing Towards Goal  Goal: *Sick day guidelines  Outcome: Progressing Towards Goal  Goal: *Patient Specific Goal (EDIT GOAL, INSERT TEXT)  Outcome: Progressing Towards Goal

## 2022-08-11 NOTE — PROGRESS NOTES
Progress Note      8/11/2022 2:38 PM  NAME: Vic Castro   MRN:  [id number]   Admit Diagnosis: Dizziness [R42]  Syncope [R55]  [facility] [J96.01]      Subjective:   Patient is a still having pain in his back and upper chest area. Patient had a some chest discomfort and still has a significant shortness of breath. Patient is on BiPAP. Patient complains of pain in epigastric area and lower chest and he had a blood work done and troponin I is 330. Discussed with the nurse. Review of Systems:    Symptom Y/N Comments  Symptom Y/N Comments   Fever/Chills n   Chest Pain y Somewhat better   Poor Appetite    Edema     Cough    Abdominal Pain n    Sputum    Joint Pain     SOB/OSCAR n   Pruritis/Rash     Nausea/vomit    Back pain y    Diarrhea         Constipation           Could NOT obtain due to:      Objective:        Physical Exam:    Last 24hrs VS reviewed since prior progress note. Most recent are:    Visit Vitals  /75 (BP 1 Location: Right upper arm, BP Patient Position: Supine)   Pulse 62   Temp 98.3 °F (36.8 °C)   Resp 18   Ht 5' 8\" (1.727 m)   Wt 132 kg (291 lb 0.1 oz)   SpO2 96% Comment: Simultaneous filing. User may not have seen previous data. BMI 44.25 kg/m²       Intake/Output Summary (Last 24 hours) at 8/11/2022 1141  Last data filed at 8/11/2022 1105  Gross per 24 hour   Intake --   Output 626 ml   Net -626 ml          General Appearance: Well developed, well nourished, alert & oriented x 3,    no acute distress. Ears/Nose/Mouth/Throat: Hearing grossly normal.  Neck: Supple. Chest: Patient is on BiPAP  Cardiovascular: Regular rate and rhythm, S1,S2 normal, no murmur. Abdomen: Soft, non-tender, bowel sounds are active. Extremities: No edema bilaterally. Skin: Warm and dry. []         Post-cath site without hematoma, bruit, tenderness, or thrill. Distal pulses intact.     PMH/SH reviewed - no change compared to H&P    Data Review    Telemetry: normal sinus rhythm     EKG:   []  No new EKG for review    Lab Data Personally Reviewed:    Recent Labs     08/11/22  0711 08/10/22  0804   WBC 5.7 5.8   HGB 12.6 12.6   HCT 37.5 37.6    182       Recent Labs     08/10/22  0804   INR 1.2*   PTP 15.1*        Recent Labs     08/11/22  0711 08/10/22  0804 08/09/22  0649    136 136   K 3.8 3.8 3.6    104 103   CO2 25 26 25   BUN 19 15 16   CREA 0.97 0.90 1.00   * 132* 130*   CA 8.8 9.0 8.4*       No results for input(s): CPK, CKNDX, TROIQ in the last 72 hours. No lab exists for component: CPKMB  No results found for: CHOL, CHOLX, CHLST, CHOLV, HDL, HDLP, LDL, LDLC, DLDLP, TGLX, TRIGL, TRIGP, CHHD, CHHDX    No results for input(s): AP, TBIL, TP, ALB, GLOB, GGT, AML, LPSE in the last 72 hours. No lab exists for component: SGOT, GPT, AMYP, HLPSE    No results for input(s): PH, PCO2, PO2 in the last 72 hours.     Medications Personally Reviewed:    Current Facility-Administered Medications   Medication Dose Route Frequency    acetaminophen (TYLENOL) tablet 1,000 mg  1,000 mg Oral Q6H    oxyCODONE IR (ROXICODONE) tablet 5 mg  5 mg Oral Q4H PRN    oxyCODONE ER (OxyCONTIN) tablet 10 mg  10 mg Oral Q12H    HYDROmorphone (DILAUDID) syringe 0.5 mg  0.5 mg IntraVENous Q4H PRN    ketorolac (TORADOL) injection 30 mg  30 mg IntraVENous PRN    midazolam (VERSED) injection 0.5-2 mg  0.5-2 mg IntraVENous Rad Multiple    fentaNYL citrate (PF) injection  mcg   mcg IntraVENous Rad Multiple    albuterol-ipratropium (DUO-NEB) 2.5 MG-0.5 MG/3 ML  3 mL Nebulization Q6H RT    nitroglycerin (NITROSTAT) tablet 0.4 mg  0.4 mg SubLINGual PRN    atorvastatin (LIPITOR) tablet 80 mg  80 mg Oral DAILY    citalopram (CELEXA) tablet 40 mg  40 mg Oral DAILY    PARoxetine (PAXIL) tablet 40 mg  40 mg Oral DAILY    zolpidem (AMBIEN) tablet 5 mg  5 mg Oral QHS PRN    [Held by provider] aspirin chewable tablet 81 mg  81 mg Oral DAILY    cholecalciferol (VITAMIN D3) (1000 Units /25 mcg) tablet 1,000 Units  1,000 Units Oral DAILY    cyanocobalamin tablet 1,000 mcg  1,000 mcg Oral DAILY    vitamin B complex capsule 1 Capsule  1 Capsule Oral DAILY    fluticasone propionate (FLONASE) 50 mcg/actuation nasal spray 2 Spray  2 Spray Both Nostrils DAILY    albuterol (PROVENTIL HFA, VENTOLIN HFA, PROAIR HFA) inhaler 2 Puff  2 Puff Inhalation Q4H PRN    amLODIPine (NORVASC) tablet 10 mg  10 mg Oral DAILY    insulin glargine (LANTUS) injection 16 Units  16 Units SubCUTAneous QHS    ranolazine ER (RANEXA) tablet 1,000 mg  1,000 mg Oral BID    calcium-vitamin D 600 mg-5 mcg (200 unit) per tablet 1 Tablet  1 Tablet Oral BID WITH MEALS    sodium chloride (NS) flush 5-40 mL  5-40 mL IntraVENous Q8H    sodium chloride (NS) flush 5-40 mL  5-40 mL IntraVENous PRN    docusate sodium (COLACE) capsule 100 mg  100 mg Oral BID    insulin lispro (HUMALOG) injection   SubCUTAneous AC&HS    glucose chewable tablet 16 g  4 Tablet Oral PRN    glucagon (GLUCAGEN) injection 1 mg  1 mg IntraMUSCular PRN    dextrose 10% infusion 0-250 mL  0-250 mL IntraVENous PRN    [Held by provider] enoxaparin (LOVENOX) injection 30 mg  30 mg SubCUTAneous Q12H    polyethylene glycol (MIRALAX) packet 17 g  17 g Oral DAILY PRN             Problem List:   Echocardiograph results explained and patient has a dilated left ventricle with severe left ventricular systolic dysfunction. Probably syncope related to alcohol intoxication. S/p ICD. Diabetes mellitus type 2. Fracture of the spine. Patient experienced epigastric/lower chest pain and her troponin I is minimally elevated. I am not convinced that this is a presentation of acute MI. Assessment/Plan:   From cardiac viewpoint I will continue present care. I am not completely convinced that his this chest pain is a presentation of acute coronary syndrome however because his troponin I is minimally elevated I will repeat again troponin I tomorrow.   I we will follow pulmonary and orthopedic recommendations. 2.           [x]       High complexity decision making was performed in this patient at high risk for decompensation with multiple organ involvement.     Emanuel Islas MD
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
## 2022-08-12 LAB
ANION GAP SERPL CALC-SCNC: 5 MMOL/L (ref 5–15)
BUN SERPL-MCNC: 16 MG/DL (ref 6–20)
BUN/CREAT SERPL: 15 (ref 12–20)
CA-I BLD-MCNC: 8.7 MG/DL (ref 8.5–10.1)
CHLORIDE SERPL-SCNC: 102 MMOL/L (ref 97–108)
CO2 SERPL-SCNC: 29 MMOL/L (ref 21–32)
CREAT SERPL-MCNC: 1.07 MG/DL (ref 0.7–1.3)
ERYTHROCYTE [DISTWIDTH] IN BLOOD BY AUTOMATED COUNT: 13.4 % (ref 11.5–14.5)
GLUCOSE BLD STRIP.AUTO-MCNC: 115 MG/DL (ref 65–117)
GLUCOSE BLD STRIP.AUTO-MCNC: 130 MG/DL (ref 65–117)
GLUCOSE BLD STRIP.AUTO-MCNC: 132 MG/DL (ref 65–117)
GLUCOSE BLD STRIP.AUTO-MCNC: 84 MG/DL (ref 65–117)
GLUCOSE SERPL-MCNC: 146 MG/DL (ref 65–100)
HCT VFR BLD AUTO: 35.5 % (ref 36.6–50.3)
HCT VFR BLD AUTO: 35.8 % (ref 36.6–50.3)
HCT VFR BLD AUTO: 37.4 % (ref 36.6–50.3)
HGB BLD-MCNC: 11.8 G/DL (ref 12.1–17)
HGB BLD-MCNC: 12.1 G/DL (ref 12.1–17)
HGB BLD-MCNC: 12.2 G/DL (ref 12.1–17)
MCH RBC QN AUTO: 32.5 PG (ref 26–34)
MCHC RBC AUTO-ENTMCNC: 32.6 G/DL (ref 30–36.5)
MCV RBC AUTO: 99.7 FL (ref 80–99)
NRBC # BLD: 0 K/UL (ref 0–0.01)
NRBC BLD-RTO: 0 PER 100 WBC
PERFORMED BY, TECHID: ABNORMAL
PERFORMED BY, TECHID: ABNORMAL
PERFORMED BY, TECHID: NORMAL
PERFORMED BY, TECHID: NORMAL
PLATELET # BLD AUTO: 212 K/UL (ref 150–400)
PMV BLD AUTO: 9.8 FL (ref 8.9–12.9)
POTASSIUM SERPL-SCNC: 4 MMOL/L (ref 3.5–5.1)
RBC # BLD AUTO: 3.75 M/UL (ref 4.1–5.7)
SODIUM SERPL-SCNC: 136 MMOL/L (ref 136–145)
WBC # BLD AUTO: 5.4 K/UL (ref 4.1–11.1)

## 2022-08-12 PROCEDURE — 77010033678 HC OXYGEN DAILY

## 2022-08-12 PROCEDURE — 97530 THERAPEUTIC ACTIVITIES: CPT

## 2022-08-12 PROCEDURE — 65270000029 HC RM PRIVATE

## 2022-08-12 PROCEDURE — 74011000250 HC RX REV CODE- 250: Performed by: INTERNAL MEDICINE

## 2022-08-12 PROCEDURE — 85027 COMPLETE CBC AUTOMATED: CPT

## 2022-08-12 PROCEDURE — 74011250637 HC RX REV CODE- 250/637: Performed by: INTERNAL MEDICINE

## 2022-08-12 PROCEDURE — 94640 AIRWAY INHALATION TREATMENT: CPT

## 2022-08-12 PROCEDURE — 36415 COLL VENOUS BLD VENIPUNCTURE: CPT

## 2022-08-12 PROCEDURE — 94660 CPAP INITIATION&MGMT: CPT

## 2022-08-12 PROCEDURE — 85018 HEMOGLOBIN: CPT

## 2022-08-12 PROCEDURE — 80048 BASIC METABOLIC PNL TOTAL CA: CPT

## 2022-08-12 PROCEDURE — 74011250636 HC RX REV CODE- 250/636: Performed by: STUDENT IN AN ORGANIZED HEALTH CARE EDUCATION/TRAINING PROGRAM

## 2022-08-12 PROCEDURE — 74011000250 HC RX REV CODE- 250: Performed by: HOSPITALIST

## 2022-08-12 PROCEDURE — 94761 N-INVAS EAR/PLS OXIMETRY MLT: CPT

## 2022-08-12 PROCEDURE — 74011250637 HC RX REV CODE- 250/637: Performed by: HOSPITALIST

## 2022-08-12 PROCEDURE — 74011636637 HC RX REV CODE- 636/637: Performed by: HOSPITALIST

## 2022-08-12 PROCEDURE — 74011250637 HC RX REV CODE- 250/637: Performed by: PHYSICIAN ASSISTANT

## 2022-08-12 PROCEDURE — 82962 GLUCOSE BLOOD TEST: CPT

## 2022-08-12 RX ADMIN — OXYCODONE 5 MG: 5 TABLET ORAL at 18:16

## 2022-08-12 RX ADMIN — OXYCODONE 5 MG: 5 TABLET ORAL at 14:10

## 2022-08-12 RX ADMIN — Medication 1 CAPSULE: at 08:59

## 2022-08-12 RX ADMIN — PAROXETINE HYDROCHLORIDE 40 MG: 20 TABLET, FILM COATED ORAL at 08:58

## 2022-08-12 RX ADMIN — OXYCODONE 5 MG: 5 TABLET ORAL at 05:35

## 2022-08-12 RX ADMIN — DOCUSATE SODIUM 100 MG: 100 CAPSULE, LIQUID FILLED ORAL at 22:09

## 2022-08-12 RX ADMIN — BUDESONIDE AND FORMOTEROL FUMARATE DIHYDRATE 2 PUFF: 160; 4.5 AEROSOL RESPIRATORY (INHALATION) at 08:56

## 2022-08-12 RX ADMIN — Medication 1 TABLET: at 08:59

## 2022-08-12 RX ADMIN — IPRATROPIUM BROMIDE AND ALBUTEROL SULFATE 3 ML: 2.5; .5 SOLUTION RESPIRATORY (INHALATION) at 01:26

## 2022-08-12 RX ADMIN — IPRATROPIUM BROMIDE AND ALBUTEROL SULFATE 3 ML: 2.5; .5 SOLUTION RESPIRATORY (INHALATION) at 08:56

## 2022-08-12 RX ADMIN — FLUTICASONE PROPIONATE 2 SPRAY: 50 SPRAY, METERED NASAL at 09:02

## 2022-08-12 RX ADMIN — Medication 1000 UNITS: at 08:59

## 2022-08-12 RX ADMIN — BUDESONIDE AND FORMOTEROL FUMARATE DIHYDRATE 2 PUFF: 160; 4.5 AEROSOL RESPIRATORY (INHALATION) at 21:49

## 2022-08-12 RX ADMIN — ACETAMINOPHEN 1000 MG: 500 TABLET, FILM COATED ORAL at 05:26

## 2022-08-12 RX ADMIN — RANOLAZINE 1000 MG: 500 TABLET, FILM COATED, EXTENDED RELEASE ORAL at 22:09

## 2022-08-12 RX ADMIN — ACETAMINOPHEN 1000 MG: 500 TABLET, FILM COATED ORAL at 13:28

## 2022-08-12 RX ADMIN — SODIUM CHLORIDE, PRESERVATIVE FREE 10 ML: 5 INJECTION INTRAVENOUS at 22:09

## 2022-08-12 RX ADMIN — RANOLAZINE 1000 MG: 500 TABLET, FILM COATED, EXTENDED RELEASE ORAL at 08:58

## 2022-08-12 RX ADMIN — ATORVASTATIN CALCIUM 80 MG: 40 TABLET, FILM COATED ORAL at 08:58

## 2022-08-12 RX ADMIN — IPRATROPIUM BROMIDE AND ALBUTEROL SULFATE 3 ML: 2.5; .5 SOLUTION RESPIRATORY (INHALATION) at 13:39

## 2022-08-12 RX ADMIN — POLYETHYLENE GLYCOL 3350 17 G: 17 POWDER, FOR SOLUTION ORAL at 10:01

## 2022-08-12 RX ADMIN — ACETAMINOPHEN 1000 MG: 500 TABLET, FILM COATED ORAL at 18:16

## 2022-08-12 RX ADMIN — SODIUM CHLORIDE, PRESERVATIVE FREE 10 ML: 5 INJECTION INTRAVENOUS at 13:27

## 2022-08-12 RX ADMIN — OXYCODONE 5 MG: 5 TABLET ORAL at 08:58

## 2022-08-12 RX ADMIN — DOCUSATE SODIUM 100 MG: 100 CAPSULE, LIQUID FILLED ORAL at 08:59

## 2022-08-12 RX ADMIN — ACETAMINOPHEN 1000 MG: 500 TABLET, FILM COATED ORAL at 00:09

## 2022-08-12 RX ADMIN — CITALOPRAM HYDROBROMIDE 40 MG: 20 TABLET ORAL at 08:59

## 2022-08-12 RX ADMIN — AMLODIPINE BESYLATE 10 MG: 5 TABLET ORAL at 08:58

## 2022-08-12 RX ADMIN — INSULIN GLARGINE 16 UNITS: 100 INJECTION, SOLUTION SUBCUTANEOUS at 22:10

## 2022-08-12 RX ADMIN — CYANOCOBALAMIN TAB 1000 MCG 1000 MCG: 1000 TAB at 08:58

## 2022-08-12 RX ADMIN — Medication 1 TABLET: at 18:16

## 2022-08-12 RX ADMIN — IPRATROPIUM BROMIDE AND ALBUTEROL SULFATE 3 ML: 2.5; .5 SOLUTION RESPIRATORY (INHALATION) at 21:26

## 2022-08-12 RX ADMIN — SODIUM CHLORIDE, PRESERVATIVE FREE 10 ML: 5 INJECTION INTRAVENOUS at 05:26
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
## 2022-08-12 NOTE — PROGRESS NOTES
CM reviewed chart and spoke with attending physician. Patient's discharge dispo is SNF. Referrals were sent to [facility] (per his daughter's request), but both declined patient due to his insurance and lack of bed availability. WERO spoke with his daughter, Roshan Balderrama ([phone]) to get more choices, and she stated she will get back to CM before EOB today. WERO will continue to follow.

## 2022-08-12 NOTE — PROGRESS NOTES
Md notified Pt had a bowel movement that had bright red blood in it. No visible hemorrhoids. Hgb today was 12.2 down from 12.6 yesterday.
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
## 2022-08-12 NOTE — PROGRESS NOTES
Progress Note      8/12/2022 2:38 PM  NAME: Frederick Jim   MRN:  [id number]   Admit Diagnosis: Dizziness [R42]  Syncope [R55]  Acute hypoxemic [facility] failure [facility] [J96.01]      Subjective:   Patient is a still having pain in his back and upper chest area. Patient had a some chest discomfort and still has a significant shortness of breath. Patient is on BiPAP. Patient complains of pain in epigastric area and lower chest and he had a blood work done and troponin I is 330. Discussed with the nurse. Review of Systems:    Symptom Y/N Comments  Symptom Y/N Comments   Fever/Chills n   Chest Pain y Somewhat better   Poor Appetite    Edema     Cough    Abdominal Pain n    Sputum    Joint Pain     SOB/OSCAR n   Pruritis/Rash     Nausea/vomit    Back pain y    Diarrhea         Constipation           Could NOT obtain due to:      Objective:        Physical Exam:    Last 24hrs VS reviewed since prior progress note. Most recent are:    Visit Vitals  /85 (BP 1 Location: Left upper arm, BP Patient Position: At rest)   Pulse 78   Temp 97.9 °F (36.6 °C)   Resp 18   Ht 5' 8\" (1.727 m)   Wt 132 kg (291 lb 0.1 oz)   SpO2 91%   BMI 44.25 kg/m²       Intake/Output Summary (Last 24 hours) at 8/12/2022 1355  Last data filed at 8/12/2022 1006  Gross per 24 hour   Intake --   Output 1325 ml   Net -1325 ml          General Appearance: Well developed, well nourished, alert & oriented x 3,    no acute distress. Ears/Nose/Mouth/Throat: Hearing grossly normal.  Neck: Supple. Chest: Patient is on BiPAP  Cardiovascular: Regular rate and rhythm, S1,S2 normal, no murmur. Abdomen: Soft, non-tender, bowel sounds are active. Extremities: No edema bilaterally. Skin: Warm and dry. []         Post-cath site without hematoma, bruit, tenderness, or thrill. Distal pulses intact.     PMH/SH reviewed - no change compared to H&P    Data Review    Telemetry: normal sinus rhythm     EKG:   []  No new EKG for review    Lab Data Personally Reviewed:    Recent Labs     08/12/22  1044 08/11/22  0711   WBC 5.4 5.7   HGB 12.2 12.6   HCT 37.4 37.5    200       Recent Labs     08/10/22  0804   INR 1.2*   PTP 15.1*        Recent Labs     08/12/22  1044 08/11/22  0711 08/10/22  0804    138 136   K 4.0 3.8 3.8    106 104   CO2 29 25 26   BUN 16 19 15   CREA 1.07 0.97 0.90   * 118* 132*   CA 8.7 8.8 9.0       No results for input(s): CPK, CKNDX, TROIQ in the last 72 hours. No lab exists for component: CPKMB  No results found for: CHOL, CHOLX, CHLST, CHOLV, HDL, HDLP, LDL, LDLC, DLDLP, TGLX, TRIGL, TRIGP, CHHD, CHHDX    No results for input(s): AP, TBIL, TP, ALB, GLOB, GGT, AML, LPSE in the last 72 hours. No lab exists for component: SGOT, GPT, AMYP, HLPSE    No results for input(s): PH, PCO2, PO2 in the last 72 hours.     Medications Personally Reviewed:    Current Facility-Administered Medications   Medication Dose Route Frequency    budesonide-formoteroL (SYMBICORT) 160-4.5 mcg/actuation HFA inhaler 2 Puff  2 Puff Inhalation BID RT    acetaminophen (TYLENOL) tablet 1,000 mg  1,000 mg Oral Q6H    oxyCODONE IR (ROXICODONE) tablet 5 mg  5 mg Oral Q4H PRN    ketorolac (TORADOL) injection 30 mg  30 mg IntraVENous PRN    midazolam (VERSED) injection 0.5-2 mg  0.5-2 mg IntraVENous Rad Multiple    fentaNYL citrate (PF) injection  mcg   mcg IntraVENous Rad Multiple    albuterol-ipratropium (DUO-NEB) 2.5 MG-0.5 MG/3 ML  3 mL Nebulization Q6H RT    nitroglycerin (NITROSTAT) tablet 0.4 mg  0.4 mg SubLINGual PRN    atorvastatin (LIPITOR) tablet 80 mg  80 mg Oral DAILY    citalopram (CELEXA) tablet 40 mg  40 mg Oral DAILY    PARoxetine (PAXIL) tablet 40 mg  40 mg Oral DAILY    zolpidem (AMBIEN) tablet 5 mg  5 mg Oral QHS PRN    [Held by provider] aspirin chewable tablet 81 mg  81 mg Oral DAILY    cholecalciferol (VITAMIN D3) (1000 Units /25 mcg) tablet 1,000 Units  1,000 Units Oral DAILY    cyanocobalamin tablet 1,000 mcg  1,000 mcg Oral DAILY    vitamin B complex capsule 1 Capsule  1 Capsule Oral DAILY    fluticasone propionate (FLONASE) 50 mcg/actuation nasal spray 2 Spray  2 Spray Both Nostrils DAILY    albuterol (PROVENTIL HFA, VENTOLIN HFA, PROAIR HFA) inhaler 2 Puff  2 Puff Inhalation Q4H PRN    amLODIPine (NORVASC) tablet 10 mg  10 mg Oral DAILY    insulin glargine (LANTUS) injection 16 Units  16 Units SubCUTAneous QHS    ranolazine ER (RANEXA) tablet 1,000 mg  1,000 mg Oral BID    calcium-vitamin D 600 mg-5 mcg (200 unit) per tablet 1 Tablet  1 Tablet Oral BID WITH MEALS    sodium chloride (NS) flush 5-40 mL  5-40 mL IntraVENous Q8H    sodium chloride (NS) flush 5-40 mL  5-40 mL IntraVENous PRN    docusate sodium (COLACE) capsule 100 mg  100 mg Oral BID    insulin lispro (HUMALOG) injection   SubCUTAneous AC&HS    glucose chewable tablet 16 g  4 Tablet Oral PRN    glucagon (GLUCAGEN) injection 1 mg  1 mg IntraMUSCular PRN    dextrose 10% infusion 0-250 mL  0-250 mL IntraVENous PRN    [Held by provider] enoxaparin (LOVENOX) injection 30 mg  30 mg SubCUTAneous Q12H    polyethylene glycol (MIRALAX) packet 17 g  17 g Oral DAILY PRN             Problem List:   Echocardiograph results explained and patient has a dilated left ventricle with severe left ventricular systolic dysfunction. Probably syncope related to alcohol intoxication. S/p ICD. Diabetes mellitus type 2. Fracture of the spine. Patient experienced epigastric/lower chest pain and her troponin I is minimally elevated. I am not convinced that this is a presentation of acute MI. Assessment/Plan:   From cardiac viewpoint I will continue present care. I am not completely convinced that his this chest pain is a presentation of acute coronary syndrome however because his troponin I is minimally elevated I will repeat again troponin I tomorrow. I we will follow pulmonary and orthopedic recommendations.   2.           [x]       High complexity decision making was performed in this patient at high risk for decompensation with multiple organ involvement.     Andrew Meyer MD
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
## 2022-08-12 NOTE — BSMART NOTE
[facility]     Section I - Integrated Summary    Chief Complaint is Depression. LOS:  2     Presenting problem/Summary:  Pt was referred for a Psych Consult due to depression. He was seen by this writer to determine the need for follow-up and supportive therapy. Pt was seen in room 589. He was sitting up in his chair watching something on his phone. He was polite and cooperative. Presented with flat affect and was calm. Pt was alert and oriented x4. His thought content, process and perception all appeared normal. He didn't appear to have any problems with memory or focus. Pt's insight and judgement didn't appear impaired. Sppech was normal. Motor skills were normal and eye contact was appropriate. He denied SI/HI/AVH. Pt reported that he was a history of depression and it has always been managed with his medication (Paroxetine). He reported that his depression became worse as his wife got sick and then after she . He reported that he is currently living with his daughter, 2 grandchildren and 2 dogs. He reported that he feels that he manages well and feels that he has a lot of support. He indicated that although he experiences sadness a lot, he isn't depressed to the point of needing additional therapy. Pt agreed to allow this writer to check in from time to time while he is in the hospital.     Precipitant Factors are Death of wife. The information is given by the patient. Current Psychiatrist and/or : None at this time. Previous Hospitalizations/Treatment: None     Lethality Assessment:  The potential for suicide noted by the following: None noted    The potential for homicide is not noted. The patient has not been a perpetrator of sexual or physical abuse. There are not pending charges. The patient is not felt to be at risk for self-harm or harm to others. Section II - Psychosocial  The patient's overall mood and attitude is calm and flat. Feelings of helplessness and hopelessness are not observed. Generalized anxiety is not observed. Panic is not observed. Phobias are not observed. Obsessive compulsive tendencies are not observed. Section III - Mental Status Exam  The patient's appearance shows no evidence of impairment. The patient's behavior shows no evidence of impairment. The patient is oriented to time, place, person and situation. The patient's speech shows no evidence of impairment. The patient's mood is depressed. The range of affect is flat. The patient's thought content demonstrates no evidence of impairment. The thought process shows no evidence of impairment. The patient's perception shows no evidence of impairment. The patient's memory shows no evidence of impairment. The patient's appetite shows no evidence of impairment. The patient's sleep shows no evidence of impairment. The patient's insight shows no evidence of impairment. The patient's judgement shows no evidence of impairment. The patient has demonstrated mental capacity to provide informed consent. Section IV - Substance Abuse    The patient is not using substances. Section V - Medical  Past Medical History:   Diagnosis Date    Asthma     SOB    CAD (coronary artery disease)     Heart Attack    Chronic obstructive pulmonary disease ([facility] [address]     Congenital heart failure ([facility] [address]     Diabetes ([facility])     H/O colonoscopy     Hypertension     Kidney stones     Morbid obesity ([facility])     Psychiatric disorder     Depression    Sleep apnea     CPAP    SOB (shortness of breath) on exertion        Section VI - Living Arrangements  The patient is . The patient lives with a child/children. The patient has one adult child. The patient does plan to return home upon discharge. The patient's source of income comes from social security. The patient's greatest support comes from his daughter. The patient has not been in an event described as horrible or outside the realm of ordinary life experience either currently or in the past. The patient has not been a victim of sexual/physical abuse. Section VII - Other Areas of Clinical Concern    The highest grade achieved is 12th grade. The patient is currently unemployed and speaks [place] as a primary language. The patient has no communication impairments affecting communication. The patient's preference for learning can be described as: can read and write adequately.   The patient's hearing is normal.  The patient's vision is normal.    The patient reports coping skills include: good thoughts    Jesse Lema, [address], CSOTP
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
## 2022-08-12 NOTE — PROGRESS NOTES
Problem: Pressure Injury - Risk of  Goal: *Prevention of pressure injury  Description: Document Sea Scale and appropriate interventions in the flowsheet. Outcome: Progressing Towards Goal  Note: Pressure Injury Interventions:  Sensory Interventions: Assess changes in LOC, Keep linens dry and wrinkle-free, Maintain/enhance activity level, Minimize linen layers    Moisture Interventions: Absorbent underpads, Internal/External urinary devices, Internal/External fecal devices, Minimize layers    Activity Interventions: PT/OT evaluation    Mobility Interventions: HOB 30 degrees or less    Nutrition Interventions: Document food/fluid/supplement intake    Friction and Shear Interventions: Apply protective barrier, creams and emollients, HOB 30 degrees or less, Lift sheet, Minimize layers                Problem: Falls - Risk of  Goal: *Absence of Falls  Description: Document Geri Fall Risk and appropriate interventions in the flowsheet. Outcome: Progressing Towards Goal  Note: Fall Risk Interventions:  Mobility Interventions: Bed/chair exit alarm, Patient to call before getting OOB, PT Consult for mobility concerns    Mentation Interventions: Adequate sleep, hydration, pain control, Bed/chair exit alarm    Medication Interventions: Bed/chair exit alarm, Patient to call before getting OOB    Elimination Interventions: Call light in reach, Bed/chair exit alarm              Problem: Discharge Planning  Goal: *Discharge to safe environment  Outcome: Progressing Towards Goal  Goal: *Knowledge of medication management  Outcome: Progressing Towards Goal  Goal: *Knowledge of discharge instructions  Outcome: Progressing Towards Goal     Problem: Diabetes Self-Management  Goal: *Disease process and treatment process  Description: Define diabetes and identify own type of diabetes; list 3 options for treating diabetes.   Outcome: Progressing Towards Goal  Goal: *Incorporating nutritional management into lifestyle  Description: Describe effect of type, amount and timing of food on blood glucose; list 3 methods for planning meals. Outcome: Progressing Towards Goal  Goal: *Incorporating physical activity into lifestyle  Description: State effect of exercise on blood glucose levels. Outcome: Progressing Towards Goal  Goal: *Developing strategies to promote health/change behavior  Description: Define the ABC's of diabetes; identify appropriate screenings, schedule and personal plan for screenings. Outcome: Progressing Towards Goal  Goal: *Using medications safely  Description: State effect of diabetes medications on diabetes; name diabetes medication taking, action and side effects. Outcome: Progressing Towards Goal  Goal: *Monitoring blood glucose, interpreting and using results  Description: Identify recommended blood glucose targets  and personal targets. Outcome: Progressing Towards Goal  Goal: *Prevention, detection, treatment of acute complications  Description: List symptoms of hyper- and hypoglycemia; describe how to treat low blood sugar and actions for lowering  high blood glucose level. Outcome: Progressing Towards Goal  Goal: *Prevention, detection and treatment of chronic complications  Description: Define the natural course of diabetes and describe the relationship of blood glucose levels to long term complications of diabetes.   Outcome: Progressing Towards Goal  Goal: *Developing strategies to address psychosocial issues  Description: Describe feelings about living with diabetes; identify support needed and support network  Outcome: Progressing Towards Goal  Goal: *Insulin pump training  Outcome: Progressing Towards Goal  Goal: *Sick day guidelines  Outcome: Progressing Towards Goal  Goal: *Patient Specific Goal (EDIT GOAL, INSERT TEXT)  Outcome: Progressing Towards Goal

## 2022-08-13 LAB
ANION GAP SERPL CALC-SCNC: 6 MMOL/L (ref 5–15)
BUN SERPL-MCNC: 15 MG/DL (ref 6–20)
BUN/CREAT SERPL: 16 (ref 12–20)
CA-I BLD-MCNC: 8.5 MG/DL (ref 8.5–10.1)
CHLORIDE SERPL-SCNC: 105 MMOL/L (ref 97–108)
CO2 SERPL-SCNC: 27 MMOL/L (ref 21–32)
CREAT SERPL-MCNC: 0.91 MG/DL (ref 0.7–1.3)
ERYTHROCYTE [DISTWIDTH] IN BLOOD BY AUTOMATED COUNT: 13.3 % (ref 11.5–14.5)
GLUCOSE BLD STRIP.AUTO-MCNC: 106 MG/DL (ref 65–117)
GLUCOSE BLD STRIP.AUTO-MCNC: 111 MG/DL (ref 65–117)
GLUCOSE BLD STRIP.AUTO-MCNC: 117 MG/DL (ref 65–117)
GLUCOSE BLD STRIP.AUTO-MCNC: 121 MG/DL (ref 65–117)
GLUCOSE SERPL-MCNC: 99 MG/DL (ref 65–100)
HCT VFR BLD AUTO: 35.4 % (ref 36.6–50.3)
HCT VFR BLD AUTO: 36 % (ref 36.6–50.3)
HCT VFR BLD AUTO: 36.7 % (ref 36.6–50.3)
HGB BLD-MCNC: 11.9 G/DL (ref 12.1–17)
HGB BLD-MCNC: 11.9 G/DL (ref 12.1–17)
HGB BLD-MCNC: 12.1 G/DL (ref 12.1–17)
MCH RBC QN AUTO: 33.1 PG (ref 26–34)
MCHC RBC AUTO-ENTMCNC: 33.6 G/DL (ref 30–36.5)
MCV RBC AUTO: 98.3 FL (ref 80–99)
NRBC # BLD: 0 K/UL (ref 0–0.01)
NRBC BLD-RTO: 0 PER 100 WBC
PERFORMED BY, TECHID: ABNORMAL
PERFORMED BY, TECHID: NORMAL
PLATELET # BLD AUTO: 212 K/UL (ref 150–400)
PMV BLD AUTO: 9.8 FL (ref 8.9–12.9)
POTASSIUM SERPL-SCNC: 4.3 MMOL/L (ref 3.5–5.1)
RBC # BLD AUTO: 3.6 M/UL (ref 4.1–5.7)
SODIUM SERPL-SCNC: 138 MMOL/L (ref 136–145)
WBC # BLD AUTO: 4.5 K/UL (ref 4.1–11.1)

## 2022-08-13 PROCEDURE — 82962 GLUCOSE BLOOD TEST: CPT

## 2022-08-13 PROCEDURE — 74011250637 HC RX REV CODE- 250/637: Performed by: HOSPITALIST

## 2022-08-13 PROCEDURE — 77010033678 HC OXYGEN DAILY

## 2022-08-13 PROCEDURE — 36415 COLL VENOUS BLD VENIPUNCTURE: CPT

## 2022-08-13 PROCEDURE — 74011000250 HC RX REV CODE- 250: Performed by: HOSPITALIST

## 2022-08-13 PROCEDURE — 94761 N-INVAS EAR/PLS OXIMETRY MLT: CPT

## 2022-08-13 PROCEDURE — 74011250637 HC RX REV CODE- 250/637: Performed by: INTERNAL MEDICINE

## 2022-08-13 PROCEDURE — 80048 BASIC METABOLIC PNL TOTAL CA: CPT

## 2022-08-13 PROCEDURE — 65270000029 HC RM PRIVATE

## 2022-08-13 PROCEDURE — 94660 CPAP INITIATION&MGMT: CPT

## 2022-08-13 PROCEDURE — 85027 COMPLETE CBC AUTOMATED: CPT

## 2022-08-13 PROCEDURE — 85018 HEMOGLOBIN: CPT

## 2022-08-13 PROCEDURE — 74011000250 HC RX REV CODE- 250: Performed by: INTERNAL MEDICINE

## 2022-08-13 PROCEDURE — 74011250637 HC RX REV CODE- 250/637: Performed by: PHYSICIAN ASSISTANT

## 2022-08-13 PROCEDURE — 94640 AIRWAY INHALATION TREATMENT: CPT

## 2022-08-13 RX ADMIN — OXYCODONE 5 MG: 5 TABLET ORAL at 09:36

## 2022-08-13 RX ADMIN — DOCUSATE SODIUM 100 MG: 100 CAPSULE, LIQUID FILLED ORAL at 22:29

## 2022-08-13 RX ADMIN — PAROXETINE HYDROCHLORIDE 40 MG: 20 TABLET, FILM COATED ORAL at 09:31

## 2022-08-13 RX ADMIN — SODIUM CHLORIDE, PRESERVATIVE FREE 10 ML: 5 INJECTION INTRAVENOUS at 13:05

## 2022-08-13 RX ADMIN — BUDESONIDE AND FORMOTEROL FUMARATE DIHYDRATE 2 PUFF: 160; 4.5 AEROSOL RESPIRATORY (INHALATION) at 19:25

## 2022-08-13 RX ADMIN — IPRATROPIUM BROMIDE AND ALBUTEROL SULFATE 3 ML: 2.5; .5 SOLUTION RESPIRATORY (INHALATION) at 08:40

## 2022-08-13 RX ADMIN — DOCUSATE SODIUM 100 MG: 100 CAPSULE, LIQUID FILLED ORAL at 09:31

## 2022-08-13 RX ADMIN — IPRATROPIUM BROMIDE AND ALBUTEROL SULFATE 3 ML: 2.5; .5 SOLUTION RESPIRATORY (INHALATION) at 14:04

## 2022-08-13 RX ADMIN — Medication 1 CAPSULE: at 09:31

## 2022-08-13 RX ADMIN — IPRATROPIUM BROMIDE AND ALBUTEROL SULFATE 3 ML: 2.5; .5 SOLUTION RESPIRATORY (INHALATION) at 02:28

## 2022-08-13 RX ADMIN — IPRATROPIUM BROMIDE AND ALBUTEROL SULFATE 3 ML: 2.5; .5 SOLUTION RESPIRATORY (INHALATION) at 19:25

## 2022-08-13 RX ADMIN — ACETAMINOPHEN 1000 MG: 500 TABLET, FILM COATED ORAL at 00:10

## 2022-08-13 RX ADMIN — FLUTICASONE PROPIONATE 2 SPRAY: 50 SPRAY, METERED NASAL at 09:36

## 2022-08-13 RX ADMIN — OXYCODONE 5 MG: 5 TABLET ORAL at 22:28

## 2022-08-13 RX ADMIN — Medication 1000 UNITS: at 09:32

## 2022-08-13 RX ADMIN — AMLODIPINE BESYLATE 10 MG: 5 TABLET ORAL at 09:32

## 2022-08-13 RX ADMIN — Medication 1 TABLET: at 16:02

## 2022-08-13 RX ADMIN — CITALOPRAM HYDROBROMIDE 40 MG: 20 TABLET ORAL at 09:31

## 2022-08-13 RX ADMIN — ACETAMINOPHEN 1000 MG: 500 TABLET, FILM COATED ORAL at 17:45

## 2022-08-13 RX ADMIN — OXYCODONE 5 MG: 5 TABLET ORAL at 16:02

## 2022-08-13 RX ADMIN — RANOLAZINE 1000 MG: 500 TABLET, FILM COATED, EXTENDED RELEASE ORAL at 22:28

## 2022-08-13 RX ADMIN — BUDESONIDE AND FORMOTEROL FUMARATE DIHYDRATE 2 PUFF: 160; 4.5 AEROSOL RESPIRATORY (INHALATION) at 08:40

## 2022-08-13 RX ADMIN — Medication 1 TABLET: at 09:31

## 2022-08-13 RX ADMIN — ATORVASTATIN CALCIUM 80 MG: 40 TABLET, FILM COATED ORAL at 09:31

## 2022-08-13 RX ADMIN — RANOLAZINE 1000 MG: 500 TABLET, FILM COATED, EXTENDED RELEASE ORAL at 09:31

## 2022-08-13 RX ADMIN — ACETAMINOPHEN 1000 MG: 500 TABLET, FILM COATED ORAL at 12:01

## 2022-08-13 RX ADMIN — CYANOCOBALAMIN TAB 1000 MCG 1000 MCG: 1000 TAB at 09:31

## 2022-08-13 RX ADMIN — SODIUM CHLORIDE, PRESERVATIVE FREE 10 ML: 5 INJECTION INTRAVENOUS at 05:31

## 2022-08-13 RX ADMIN — ACETAMINOPHEN 1000 MG: 500 TABLET, FILM COATED ORAL at 05:30
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
## 2022-08-13 NOTE — PROGRESS NOTES
Problem: Pressure Injury - Risk of  Goal: *Prevention of pressure injury  Description: Document Sea Scale and appropriate interventions in the flowsheet. Outcome: Progressing Towards Goal  Note: Pressure Injury Interventions:  Sensory Interventions: Discuss PT/OT consult with provider, Keep linens dry and wrinkle-free, Maintain/enhance activity level, Minimize linen layers, Assess need for specialty bed    Moisture Interventions: Absorbent underpads, Maintain skin hydration (lotion/cream), Minimize layers, Offer toileting Q_hr    Activity Interventions: PT/OT evaluation, Increase time out of bed    Mobility Interventions: HOB 30 degrees or less, PT/OT evaluation, Float heels    Nutrition Interventions: Document food/fluid/supplement intake, Offer support with meals,snacks and hydration    Friction and Shear Interventions: HOB 30 degrees or less, Minimize layers                Problem: Falls - Risk of  Goal: *Absence of Falls  Description: Document Geri Fall Risk and appropriate interventions in the flowsheet.   Outcome: Progressing Towards Goal  Note: Fall Risk Interventions:  Mobility Interventions: Bed/chair exit alarm, OT consult for ADLs, Patient to call before getting OOB, PT Consult for mobility concerns, PT Consult for assist device competence, Utilize walker, cane, or other assistive device    Mentation Interventions: Adequate sleep, hydration, pain control, Bed/chair exit alarm, Door open when patient unattended, More frequent rounding, Room close to nurse's station, Toileting rounds, Update white board    Medication Interventions: Bed/chair exit alarm, Patient to call before getting OOB, Teach patient to arise slowly    Elimination Interventions: Bed/chair exit alarm, Call light in reach, Patient to call for help with toileting needs, Toileting schedule/hourly rounds, Urinal in reach              Problem: Diabetes Self-Management  Goal: *Disease process and treatment process  Description: Define diabetes and identify own type of diabetes; list 3 options for treating diabetes.   Outcome: Progressing Towards Goal

## 2022-08-13 NOTE — PROGRESS NOTES
Progress Note      8/13/2022 2:38 PM  NAME: Chayo Gupta   MRN:  [id number]   Admit Diagnosis: Dizziness [R42]  Syncope [R55]  [facility] [J96.01]      Subjective:   Patient is a still having pain in his back and upper chest area. Patient had a some chest discomfort and still has a significant shortness of breath. Patient is on BiPAP. Patient complains of pain in epigastric area and lower chest and he had a blood work done and troponin I is 330. Discussed with the nurse. Review of Systems:    Symptom Y/N Comments  Symptom Y/N Comments   Fever/Chills n   Chest Pain y Somewhat better   Poor Appetite    Edema     Cough    Abdominal Pain n    Sputum    Joint Pain     SOB/OSCAR n   Pruritis/Rash     Nausea/vomit    Back pain y    Diarrhea         Constipation           Could NOT obtain due to:      Objective:        Physical Exam:    Last 24hrs VS reviewed since prior progress note. Most recent are:    Visit Vitals  /69 (BP 1 Location: Right upper arm, BP Patient Position: At rest;Supine)   Pulse 76   Temp 97.6 °F (36.4 °C)   Resp 20   Ht 5' 8\" (1.727 m)   Wt 132 kg (291 lb 0.1 oz)   SpO2 92%   BMI 44.25 kg/m²       Intake/Output Summary (Last 24 hours) at 8/13/2022 1533  Last data filed at 8/12/2022 1816  Gross per 24 hour   Intake --   Output 500 ml   Net -500 ml          General Appearance: Well developed, well nourished, alert & oriented x 3,    no acute distress. Ears/Nose/Mouth/Throat: Hearing grossly normal.  Neck: Supple. Chest: Patient is on BiPAP  Cardiovascular: Regular rate and rhythm, S1,S2 normal, no murmur. Abdomen: Soft, non-tender, bowel sounds are active. Extremities: No edema bilaterally. Skin: Warm and dry. []         Post-cath site without hematoma, bruit, tenderness, or thrill. Distal pulses intact.     PMH/SH reviewed - no change compared to H&P    Data Review    Telemetry: normal sinus rhythm     EKG:   []  No new EKG for review    Lab Data Personally Reviewed:    Recent Labs     08/13/22  1250 08/13/22  0605 08/12/22  1448 08/12/22  1044   WBC  --  4.5  --  5.4   HGB 12.1 11.9*   < > 12.2   HCT 36.7 35.4*   < > 37.4   PLT  --  212  --  212    < > = values in this interval not displayed. No results for input(s): INR, PTP, APTT, INREXT, INREXT in the last 72 hours. Recent Labs     08/13/22  0605 08/12/22  1044 08/11/22  0711    136 138   K 4.3 4.0 3.8    102 106   CO2 27 29 25   BUN 15 16 19   CREA 0.91 1.07 0.97   GLU 99 146* 118*   CA 8.5 8.7 8.8       No results for input(s): CPK, CKNDX, TROIQ in the last 72 hours. No lab exists for component: CPKMB  No results found for: CHOL, CHOLX, CHLST, CHOLV, HDL, HDLP, LDL, LDLC, DLDLP, TGLX, TRIGL, TRIGP, CHHD, CHHDX    No results for input(s): AP, TBIL, TP, ALB, GLOB, GGT, AML, LPSE in the last 72 hours. No lab exists for component: SGOT, GPT, AMYP, HLPSE    No results for input(s): PH, PCO2, PO2 in the last 72 hours.     Medications Personally Reviewed:    Current Facility-Administered Medications   Medication Dose Route Frequency    budesonide-formoteroL (SYMBICORT) 160-4.5 mcg/actuation HFA inhaler 2 Puff  2 Puff Inhalation BID RT    acetaminophen (TYLENOL) tablet 1,000 mg  1,000 mg Oral Q6H    oxyCODONE IR (ROXICODONE) tablet 5 mg  5 mg Oral Q4H PRN    ketorolac (TORADOL) injection 30 mg  30 mg IntraVENous PRN    midazolam (VERSED) injection 0.5-2 mg  0.5-2 mg IntraVENous Rad Multiple    fentaNYL citrate (PF) injection  mcg   mcg IntraVENous Rad Multiple    albuterol-ipratropium (DUO-NEB) 2.5 MG-0.5 MG/3 ML  3 mL Nebulization Q6H RT    nitroglycerin (NITROSTAT) tablet 0.4 mg  0.4 mg SubLINGual PRN    atorvastatin (LIPITOR) tablet 80 mg  80 mg Oral DAILY    citalopram (CELEXA) tablet 40 mg  40 mg Oral DAILY    PARoxetine (PAXIL) tablet 40 mg  40 mg Oral DAILY    zolpidem (AMBIEN) tablet 5 mg  5 mg Oral QHS PRN    [Held by provider] aspirin chewable tablet 81 mg 81 mg Oral DAILY    cholecalciferol (VITAMIN D3) (1000 Units /25 mcg) tablet 1,000 Units  1,000 Units Oral DAILY    cyanocobalamin tablet 1,000 mcg  1,000 mcg Oral DAILY    vitamin B complex capsule 1 Capsule  1 Capsule Oral DAILY    fluticasone propionate (FLONASE) 50 mcg/actuation nasal spray 2 Spray  2 Spray Both Nostrils DAILY    albuterol (PROVENTIL HFA, VENTOLIN HFA, PROAIR HFA) inhaler 2 Puff  2 Puff Inhalation Q4H PRN    amLODIPine (NORVASC) tablet 10 mg  10 mg Oral DAILY    insulin glargine (LANTUS) injection 16 Units  16 Units SubCUTAneous QHS    ranolazine ER (RANEXA) tablet 1,000 mg  1,000 mg Oral BID    calcium-vitamin D 600 mg-5 mcg (200 unit) per tablet 1 Tablet  1 Tablet Oral BID WITH MEALS    sodium chloride (NS) flush 5-40 mL  5-40 mL IntraVENous Q8H    sodium chloride (NS) flush 5-40 mL  5-40 mL IntraVENous PRN    docusate sodium (COLACE) capsule 100 mg  100 mg Oral BID    insulin lispro (HUMALOG) injection   SubCUTAneous AC&HS    glucose chewable tablet 16 g  4 Tablet Oral PRN    glucagon (GLUCAGEN) injection 1 mg  1 mg IntraMUSCular PRN    dextrose 10% infusion 0-250 mL  0-250 mL IntraVENous PRN    [Held by provider] enoxaparin (LOVENOX) injection 30 mg  30 mg SubCUTAneous Q12H    polyethylene glycol (MIRALAX) packet 17 g  17 g Oral DAILY PRN             Problem List:   Echocardiograph results explained and patient has a dilated left ventricle with severe left ventricular systolic dysfunction. Probably syncope related to alcohol intoxication. S/p ICD. Diabetes mellitus type 2. Fracture of the spine. Patient experienced epigastric/lower chest pain and her troponin I is minimally elevated. I am not convinced that this is a presentation of acute MI. Assessment/Plan:   From cardiac viewpoint I will continue present care.   I am not completely convinced that his this chest pain is a presentation of acute coronary syndrome however because his troponin I is minimally elevated I will repeat again troponin I tomorrow. I we will follow pulmonary and orthopedic recommendations. 2.           [x]       High complexity decision making was performed in this patient at high risk for decompensation with multiple organ involvement.     Roshan Newsome MD
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
## 2022-08-14 LAB
ANION GAP SERPL CALC-SCNC: 6 MMOL/L (ref 5–15)
BUN SERPL-MCNC: 18 MG/DL (ref 6–20)
BUN/CREAT SERPL: 17 (ref 12–20)
CA-I BLD-MCNC: 8.7 MG/DL (ref 8.5–10.1)
CHLORIDE SERPL-SCNC: 105 MMOL/L (ref 97–108)
CO2 SERPL-SCNC: 25 MMOL/L (ref 21–32)
CREAT SERPL-MCNC: 1.08 MG/DL (ref 0.7–1.3)
ERYTHROCYTE [DISTWIDTH] IN BLOOD BY AUTOMATED COUNT: 13.1 % (ref 11.5–14.5)
GLUCOSE BLD STRIP.AUTO-MCNC: 110 MG/DL (ref 65–117)
GLUCOSE BLD STRIP.AUTO-MCNC: 164 MG/DL (ref 65–117)
GLUCOSE BLD STRIP.AUTO-MCNC: 92 MG/DL (ref 65–117)
GLUCOSE BLD STRIP.AUTO-MCNC: 95 MG/DL (ref 65–117)
GLUCOSE SERPL-MCNC: 129 MG/DL (ref 65–100)
HCT VFR BLD AUTO: 36.1 % (ref 36.6–50.3)
HGB BLD-MCNC: 12 G/DL (ref 12.1–17)
MCH RBC QN AUTO: 32.6 PG (ref 26–34)
MCHC RBC AUTO-ENTMCNC: 33.2 G/DL (ref 30–36.5)
MCV RBC AUTO: 98.1 FL (ref 80–99)
NRBC # BLD: 0 K/UL (ref 0–0.01)
NRBC BLD-RTO: 0 PER 100 WBC
PERFORMED BY, TECHID: ABNORMAL
PERFORMED BY, TECHID: NORMAL
PLATELET # BLD AUTO: 211 K/UL (ref 150–400)
PMV BLD AUTO: 9.2 FL (ref 8.9–12.9)
POTASSIUM SERPL-SCNC: 4.4 MMOL/L (ref 3.5–5.1)
RBC # BLD AUTO: 3.68 M/UL (ref 4.1–5.7)
SODIUM SERPL-SCNC: 136 MMOL/L (ref 136–145)
WBC # BLD AUTO: 4.2 K/UL (ref 4.1–11.1)

## 2022-08-14 PROCEDURE — 94640 AIRWAY INHALATION TREATMENT: CPT

## 2022-08-14 PROCEDURE — 74011250637 HC RX REV CODE- 250/637: Performed by: HOSPITALIST

## 2022-08-14 PROCEDURE — 85027 COMPLETE CBC AUTOMATED: CPT

## 2022-08-14 PROCEDURE — 36415 COLL VENOUS BLD VENIPUNCTURE: CPT

## 2022-08-14 PROCEDURE — 74011000250 HC RX REV CODE- 250: Performed by: INTERNAL MEDICINE

## 2022-08-14 PROCEDURE — 74011250637 HC RX REV CODE- 250/637: Performed by: INTERNAL MEDICINE

## 2022-08-14 PROCEDURE — 82962 GLUCOSE BLOOD TEST: CPT

## 2022-08-14 PROCEDURE — 77010033678 HC OXYGEN DAILY

## 2022-08-14 PROCEDURE — 94660 CPAP INITIATION&MGMT: CPT

## 2022-08-14 PROCEDURE — 74011250636 HC RX REV CODE- 250/636: Performed by: INTERNAL MEDICINE

## 2022-08-14 PROCEDURE — 74011636637 HC RX REV CODE- 636/637: Performed by: HOSPITALIST

## 2022-08-14 PROCEDURE — 74011250637 HC RX REV CODE- 250/637: Performed by: PHYSICIAN ASSISTANT

## 2022-08-14 PROCEDURE — 80048 BASIC METABOLIC PNL TOTAL CA: CPT

## 2022-08-14 PROCEDURE — 65270000029 HC RM PRIVATE

## 2022-08-14 RX ADMIN — Medication 1 TABLET: at 17:08

## 2022-08-14 RX ADMIN — Medication 1 CAPSULE: at 09:00

## 2022-08-14 RX ADMIN — BUDESONIDE AND FORMOTEROL FUMARATE DIHYDRATE 2 PUFF: 160; 4.5 AEROSOL RESPIRATORY (INHALATION) at 20:27

## 2022-08-14 RX ADMIN — ACETAMINOPHEN 1000 MG: 500 TABLET, FILM COATED ORAL at 00:53

## 2022-08-14 RX ADMIN — AMLODIPINE BESYLATE 10 MG: 5 TABLET ORAL at 09:19

## 2022-08-14 RX ADMIN — OXYCODONE 5 MG: 5 TABLET ORAL at 20:54

## 2022-08-14 RX ADMIN — RANOLAZINE 1000 MG: 500 TABLET, FILM COATED, EXTENDED RELEASE ORAL at 09:19

## 2022-08-14 RX ADMIN — DOCUSATE SODIUM 100 MG: 100 CAPSULE, LIQUID FILLED ORAL at 09:20

## 2022-08-14 RX ADMIN — ACETAMINOPHEN 1000 MG: 500 TABLET, FILM COATED ORAL at 12:20

## 2022-08-14 RX ADMIN — INSULIN GLARGINE 16 UNITS: 100 INJECTION, SOLUTION SUBCUTANEOUS at 20:53

## 2022-08-14 RX ADMIN — IPRATROPIUM BROMIDE AND ALBUTEROL SULFATE 3 ML: 2.5; .5 SOLUTION RESPIRATORY (INHALATION) at 14:53

## 2022-08-14 RX ADMIN — ACETAMINOPHEN 1000 MG: 500 TABLET, FILM COATED ORAL at 05:36

## 2022-08-14 RX ADMIN — DOCUSATE SODIUM 100 MG: 100 CAPSULE, LIQUID FILLED ORAL at 20:53

## 2022-08-14 RX ADMIN — ENOXAPARIN SODIUM 30 MG: 100 INJECTION SUBCUTANEOUS at 20:53

## 2022-08-14 RX ADMIN — CITALOPRAM HYDROBROMIDE 40 MG: 20 TABLET ORAL at 09:20

## 2022-08-14 RX ADMIN — ACETAMINOPHEN 1000 MG: 500 TABLET, FILM COATED ORAL at 17:08

## 2022-08-14 RX ADMIN — PAROXETINE HYDROCHLORIDE 40 MG: 20 TABLET, FILM COATED ORAL at 09:18

## 2022-08-14 RX ADMIN — FLUTICASONE PROPIONATE 2 SPRAY: 50 SPRAY, METERED NASAL at 09:17

## 2022-08-14 RX ADMIN — RANOLAZINE 1000 MG: 500 TABLET, FILM COATED, EXTENDED RELEASE ORAL at 20:53

## 2022-08-14 RX ADMIN — Medication 1 TABLET: at 09:18

## 2022-08-14 RX ADMIN — ATORVASTATIN CALCIUM 80 MG: 40 TABLET, FILM COATED ORAL at 09:19

## 2022-08-14 RX ADMIN — IPRATROPIUM BROMIDE AND ALBUTEROL SULFATE 3 ML: 2.5; .5 SOLUTION RESPIRATORY (INHALATION) at 08:44

## 2022-08-14 RX ADMIN — CYANOCOBALAMIN TAB 1000 MCG 1000 MCG: 1000 TAB at 09:19

## 2022-08-14 RX ADMIN — IPRATROPIUM BROMIDE AND ALBUTEROL SULFATE 3 ML: 2.5; .5 SOLUTION RESPIRATORY (INHALATION) at 01:04

## 2022-08-14 RX ADMIN — BUDESONIDE AND FORMOTEROL FUMARATE DIHYDRATE 2 PUFF: 160; 4.5 AEROSOL RESPIRATORY (INHALATION) at 08:54

## 2022-08-14 RX ADMIN — Medication 1000 UNITS: at 09:19

## 2022-08-14 RX ADMIN — IPRATROPIUM BROMIDE AND ALBUTEROL SULFATE 3 ML: 2.5; .5 SOLUTION RESPIRATORY (INHALATION) at 20:27
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
## 2022-08-14 NOTE — PROGRESS NOTES
Progress Note      8/14/2022 2:38 PM  NAME: Frederick Jim   MRN:  [id number]   Admit Diagnosis: Dizziness [R42]  Syncope [R55]  Acute hypoxemic respiratory failure ([facility] [[email or web address] [J96.01]      Subjective:   Patient is a still having pain in his back and upper chest area. Patient had a some chest discomfort and still has a significant shortness of breath. Patient is on BiPAP. Patient complains of pain in epigastric area and lower chest and he had a blood work done and troponin I is 330. Discussed with the nurse. Review of Systems:    Symptom Y/N Comments  Symptom Y/N Comments   Fever/Chills n   Chest Pain y Somewhat better   Poor Appetite    Edema     Cough    Abdominal Pain n    Sputum    Joint Pain     SOB/OSCAR n   Pruritis/Rash     Nausea/vomit    Back pain y    Diarrhea         Constipation           Could NOT obtain due to:      Objective:        Physical Exam:    Last 24hrs VS reviewed since prior progress note. Most recent are:    Visit Vitals  /77   Pulse 69   Temp 97.6 °F (36.4 °C)   Resp 19   Ht 5' 8\" (1.727 m)   Wt 132 kg (291 lb 0.1 oz)   SpO2 96%   BMI 44.25 kg/m²       Intake/Output Summary (Last 24 hours) at 8/14/2022 5845  Last data filed at 8/13/2022 1747  Gross per 24 hour   Intake --   Output 840 ml   Net -840 ml          General Appearance: Well developed, well nourished, alert & oriented x 3,    no acute distress. Ears/Nose/Mouth/Throat: Hearing grossly normal.  Neck: Supple. Chest: Patient is on BiPAP  Cardiovascular: Regular rate and rhythm, S1,S2 normal, no murmur. Abdomen: Soft, non-tender, bowel sounds are active. Extremities: No edema bilaterally. Skin: Warm and dry. []         Post-cath site without hematoma, bruit, tenderness, or thrill. Distal pulses intact.     PMH/SH reviewed - no change compared to H&P    Data Review    Telemetry: normal sinus rhythm     EKG:   []  No new EKG for review    Lab Data Personally Reviewed:    Recent Labs     08/14/22  9372 08/13/22  2138 08/13/22  1250 08/13/22  0605   WBC 4.2  --   --  4.5   HGB 12.0* 11.9*   < > 11.9*   HCT 36.1* 36.0*   < > 35.4*     --   --  212    < > = values in this interval not displayed. No results for input(s): INR, PTP, APTT, INREXT, INREXT in the last 72 hours. Recent Labs     08/14/22  0432 08/13/22  0605 08/12/22  1044    138 136   K 4.4 4.3 4.0    105 102   CO2 25 27 29   BUN 18 15 16   CREA 1.08 0.91 1.07   * 99 146*   CA 8.7 8.5 8.7       No results for input(s): CPK, CKNDX, TROIQ in the last 72 hours. No lab exists for component: CPKMB  No results found for: CHOL, CHOLX, CHLST, CHOLV, HDL, HDLP, LDL, LDLC, DLDLP, TGLX, TRIGL, TRIGP, CHHD, CHHDX    No results for input(s): AP, TBIL, TP, ALB, GLOB, GGT, AML, LPSE in the last 72 hours. No lab exists for component: SGOT, GPT, AMYP, HLPSE    No results for input(s): PH, PCO2, PO2 in the last 72 hours.     Medications Personally Reviewed:    Current Facility-Administered Medications   Medication Dose Route Frequency    budesonide-formoteroL (SYMBICORT) 160-4.5 mcg/actuation HFA inhaler 2 Puff  2 Puff Inhalation BID RT    acetaminophen (TYLENOL) tablet 1,000 mg  1,000 mg Oral Q6H    oxyCODONE IR (ROXICODONE) tablet 5 mg  5 mg Oral Q4H PRN    ketorolac (TORADOL) injection 30 mg  30 mg IntraVENous PRN    midazolam (VERSED) injection 0.5-2 mg  0.5-2 mg IntraVENous Rad Multiple    fentaNYL citrate (PF) injection  mcg   mcg IntraVENous Rad Multiple    albuterol-ipratropium (DUO-NEB) 2.5 MG-0.5 MG/3 ML  3 mL Nebulization Q6H RT    nitroglycerin (NITROSTAT) tablet 0.4 mg  0.4 mg SubLINGual PRN    atorvastatin (LIPITOR) tablet 80 mg  80 mg Oral DAILY    citalopram (CELEXA) tablet 40 mg  40 mg Oral DAILY    PARoxetine (PAXIL) tablet 40 mg  40 mg Oral DAILY    zolpidem (AMBIEN) tablet 5 mg  5 mg Oral QHS PRN    [Held by provider] aspirin chewable tablet 81 mg  81 mg Oral DAILY    cholecalciferol (VITAMIN D3) (1000 Units /25 mcg) tablet 1,000 Units  1,000 Units Oral DAILY    cyanocobalamin tablet 1,000 mcg  1,000 mcg Oral DAILY    vitamin B complex capsule 1 Capsule  1 Capsule Oral DAILY    fluticasone propionate (FLONASE) 50 mcg/actuation nasal spray 2 Spray  2 Spray Both Nostrils DAILY    albuterol (PROVENTIL HFA, VENTOLIN HFA, PROAIR HFA) inhaler 2 Puff  2 Puff Inhalation Q4H PRN    amLODIPine (NORVASC) tablet 10 mg  10 mg Oral DAILY    insulin glargine (LANTUS) injection 16 Units  16 Units SubCUTAneous QHS    ranolazine ER (RANEXA) tablet 1,000 mg  1,000 mg Oral BID    calcium-vitamin D 600 mg-5 mcg (200 unit) per tablet 1 Tablet  1 Tablet Oral BID WITH MEALS    sodium chloride (NS) flush 5-40 mL  5-40 mL IntraVENous PRN    docusate sodium (COLACE) capsule 100 mg  100 mg Oral BID    insulin lispro (HUMALOG) injection   SubCUTAneous AC&HS    glucose chewable tablet 16 g  4 Tablet Oral PRN    glucagon (GLUCAGEN) injection 1 mg  1 mg IntraMUSCular PRN    dextrose 10% infusion 0-250 mL  0-250 mL IntraVENous PRN    [Held by provider] enoxaparin (LOVENOX) injection 30 mg  30 mg SubCUTAneous Q12H    polyethylene glycol (MIRALAX) packet 17 g  17 g Oral DAILY PRN             Problem List:   Echocardiograph results explained and patient has a dilated left ventricle with severe left ventricular systolic dysfunction. Probably syncope related to alcohol intoxication. S/p ICD. Diabetes mellitus type 2. Fracture of the spine. Patient experienced epigastric/lower chest pain and her troponin I is minimally elevated. I am not convinced that this is a presentation of acute MI. Assessment/Plan:   From cardiac viewpoint I will continue present care. I am not completely convinced that his this chest pain is a presentation of acute coronary syndrome however because his troponin I is minimally elevated I will repeat again troponin I tomorrow. I we will follow pulmonary and orthopedic recommendations.   2.           [x]       High complexity decision making was performed in this patient at high risk for decompensation with multiple organ involvement.     Alessandra Rutledge MD

## 2022-08-15 LAB
ANION GAP SERPL CALC-SCNC: 6 MMOL/L (ref 5–15)
BUN SERPL-MCNC: 18 MG/DL (ref 6–20)
BUN/CREAT SERPL: 18 (ref 12–20)
CA-I BLD-MCNC: 9 MG/DL (ref 8.5–10.1)
CHLORIDE SERPL-SCNC: 106 MMOL/L (ref 97–108)
CO2 SERPL-SCNC: 26 MMOL/L (ref 21–32)
CREAT SERPL-MCNC: 1.02 MG/DL (ref 0.7–1.3)
ERYTHROCYTE [DISTWIDTH] IN BLOOD BY AUTOMATED COUNT: 13.1 % (ref 11.5–14.5)
GLUCOSE BLD STRIP.AUTO-MCNC: 108 MG/DL (ref 65–117)
GLUCOSE BLD STRIP.AUTO-MCNC: 144 MG/DL (ref 65–117)
GLUCOSE BLD STRIP.AUTO-MCNC: 278 MG/DL (ref 65–117)
GLUCOSE BLD STRIP.AUTO-MCNC: 71 MG/DL (ref 65–117)
GLUCOSE SERPL-MCNC: 94 MG/DL (ref 65–100)
HCT VFR BLD AUTO: 37.3 % (ref 36.6–50.3)
HGB BLD-MCNC: 12.4 G/DL (ref 12.1–17)
MCH RBC QN AUTO: 32.9 PG (ref 26–34)
MCHC RBC AUTO-ENTMCNC: 33.2 G/DL (ref 30–36.5)
MCV RBC AUTO: 98.9 FL (ref 80–99)
NRBC # BLD: 0 K/UL (ref 0–0.01)
NRBC BLD-RTO: 0 PER 100 WBC
PERFORMED BY, TECHID: ABNORMAL
PERFORMED BY, TECHID: ABNORMAL
PERFORMED BY, TECHID: NORMAL
PERFORMED BY, TECHID: NORMAL
PLATELET # BLD AUTO: 239 K/UL (ref 150–400)
PMV BLD AUTO: 9.3 FL (ref 8.9–12.9)
POTASSIUM SERPL-SCNC: 4.2 MMOL/L (ref 3.5–5.1)
RBC # BLD AUTO: 3.77 M/UL (ref 4.1–5.7)
SODIUM SERPL-SCNC: 138 MMOL/L (ref 136–145)
WBC # BLD AUTO: 5.5 K/UL (ref 4.1–11.1)

## 2022-08-15 PROCEDURE — 77010033678 HC OXYGEN DAILY

## 2022-08-15 PROCEDURE — 36415 COLL VENOUS BLD VENIPUNCTURE: CPT

## 2022-08-15 PROCEDURE — 85027 COMPLETE CBC AUTOMATED: CPT

## 2022-08-15 PROCEDURE — 97530 THERAPEUTIC ACTIVITIES: CPT

## 2022-08-15 PROCEDURE — 82962 GLUCOSE BLOOD TEST: CPT

## 2022-08-15 PROCEDURE — 74011636637 HC RX REV CODE- 636/637: Performed by: HOSPITALIST

## 2022-08-15 PROCEDURE — 94640 AIRWAY INHALATION TREATMENT: CPT

## 2022-08-15 PROCEDURE — 74011250637 HC RX REV CODE- 250/637: Performed by: PHYSICIAN ASSISTANT

## 2022-08-15 PROCEDURE — 80048 BASIC METABOLIC PNL TOTAL CA: CPT

## 2022-08-15 PROCEDURE — 65270000029 HC RM PRIVATE

## 2022-08-15 PROCEDURE — 74011250637 HC RX REV CODE- 250/637: Performed by: HOSPITALIST

## 2022-08-15 PROCEDURE — 74011000250 HC RX REV CODE- 250: Performed by: INTERNAL MEDICINE

## 2022-08-15 PROCEDURE — 94761 N-INVAS EAR/PLS OXIMETRY MLT: CPT

## 2022-08-15 PROCEDURE — 74011250636 HC RX REV CODE- 250/636: Performed by: INTERNAL MEDICINE

## 2022-08-15 PROCEDURE — 94660 CPAP INITIATION&MGMT: CPT

## 2022-08-15 PROCEDURE — 74011250637 HC RX REV CODE- 250/637: Performed by: INTERNAL MEDICINE

## 2022-08-15 RX ADMIN — ENOXAPARIN SODIUM 30 MG: 100 INJECTION SUBCUTANEOUS at 09:48

## 2022-08-15 RX ADMIN — OXYCODONE 5 MG: 5 TABLET ORAL at 09:49

## 2022-08-15 RX ADMIN — BUDESONIDE AND FORMOTEROL FUMARATE DIHYDRATE 2 PUFF: 160; 4.5 AEROSOL RESPIRATORY (INHALATION) at 08:18

## 2022-08-15 RX ADMIN — FLUTICASONE PROPIONATE 2 SPRAY: 50 SPRAY, METERED NASAL at 09:52

## 2022-08-15 RX ADMIN — IPRATROPIUM BROMIDE AND ALBUTEROL SULFATE 3 ML: 2.5; .5 SOLUTION RESPIRATORY (INHALATION) at 02:10

## 2022-08-15 RX ADMIN — Medication 1000 UNITS: at 09:49

## 2022-08-15 RX ADMIN — ACETAMINOPHEN 1000 MG: 500 TABLET, FILM COATED ORAL at 01:00

## 2022-08-15 RX ADMIN — IPRATROPIUM BROMIDE AND ALBUTEROL SULFATE 3 ML: 2.5; .5 SOLUTION RESPIRATORY (INHALATION) at 14:17

## 2022-08-15 RX ADMIN — DOCUSATE SODIUM 100 MG: 100 CAPSULE, LIQUID FILLED ORAL at 21:03

## 2022-08-15 RX ADMIN — Medication 1 CAPSULE: at 11:20

## 2022-08-15 RX ADMIN — AMLODIPINE BESYLATE 10 MG: 5 TABLET ORAL at 09:49

## 2022-08-15 RX ADMIN — INSULIN LISPRO 3 UNITS: 100 INJECTION, SOLUTION INTRAVENOUS; SUBCUTANEOUS at 11:26

## 2022-08-15 RX ADMIN — ENOXAPARIN SODIUM 30 MG: 100 INJECTION SUBCUTANEOUS at 21:03

## 2022-08-15 RX ADMIN — INSULIN GLARGINE 16 UNITS: 100 INJECTION, SOLUTION SUBCUTANEOUS at 21:03

## 2022-08-15 RX ADMIN — PAROXETINE HYDROCHLORIDE 40 MG: 20 TABLET, FILM COATED ORAL at 09:49

## 2022-08-15 RX ADMIN — CYANOCOBALAMIN TAB 1000 MCG 1000 MCG: 1000 TAB at 09:50

## 2022-08-15 RX ADMIN — IPRATROPIUM BROMIDE AND ALBUTEROL SULFATE 3 ML: 2.5; .5 SOLUTION RESPIRATORY (INHALATION) at 08:18

## 2022-08-15 RX ADMIN — RANOLAZINE 1000 MG: 500 TABLET, FILM COATED, EXTENDED RELEASE ORAL at 23:56

## 2022-08-15 RX ADMIN — Medication 1 TABLET: at 09:49

## 2022-08-15 RX ADMIN — INSULIN LISPRO 4 UNITS: 100 INJECTION, SOLUTION INTRAVENOUS; SUBCUTANEOUS at 21:03

## 2022-08-15 RX ADMIN — CITALOPRAM HYDROBROMIDE 40 MG: 20 TABLET ORAL at 09:49

## 2022-08-15 RX ADMIN — ATORVASTATIN CALCIUM 80 MG: 40 TABLET, FILM COATED ORAL at 09:49

## 2022-08-15 RX ADMIN — ACETAMINOPHEN 1000 MG: 500 TABLET, FILM COATED ORAL at 17:25

## 2022-08-15 RX ADMIN — Medication 1 TABLET: at 17:25

## 2022-08-15 RX ADMIN — BUDESONIDE AND FORMOTEROL FUMARATE DIHYDRATE 2 PUFF: 160; 4.5 AEROSOL RESPIRATORY (INHALATION) at 21:29

## 2022-08-15 RX ADMIN — IPRATROPIUM BROMIDE AND ALBUTEROL SULFATE 3 ML: 2.5; .5 SOLUTION RESPIRATORY (INHALATION) at 21:29

## 2022-08-15 RX ADMIN — DOCUSATE SODIUM 100 MG: 100 CAPSULE, LIQUID FILLED ORAL at 09:49

## 2022-08-15 RX ADMIN — RANOLAZINE 1000 MG: 500 TABLET, FILM COATED, EXTENDED RELEASE ORAL at 09:49

## 2022-08-15 RX ADMIN — ACETAMINOPHEN 1000 MG: 500 TABLET, FILM COATED ORAL at 09:49

## 2022-08-15 RX ADMIN — ACETAMINOPHEN 1000 MG: 500 TABLET, FILM COATED ORAL at 13:12

## 2022-08-15 NOTE — PROGRESS NOTES
CM reviewed chart. Discharge dispo:  SNF. Patient has been accept to [address] [place] [address]. They will be initiating auth today. CM will continue to follow.

## 2022-08-15 NOTE — PROGRESS NOTES
PHYSICAL THERAPY TREATMENT  Patient: Kole Cobos (73 y.o. male)  Date: 8/15/2022  Diagnosis: Dizziness [R42]  Syncope [R55]  [facility] hypox[facility] [J96.01] <principal problem not specified>      Precautions:    Chart, physical therapy assessment, plan of care and goals were reviewed. ASSESSMENT  Patient continues with skilled PT services and is progressing towards goals. Patient supine in bed upon approach and agreed to therapy session today. Patient was SBA for bed mobility, with lo rolling technique, supine>sit at SBA and SBA for scooting to EOB where patient demonstrated good unsupported balance. Patient then performed STS to RW at [facility] and ambulated 12 feet to toilet at [facility]  where patient was CGA for STS> onto toilet. Patient then amble to have BM and was able to perform luz marina care without assistance. Patient then was CGA for standing at sink to wash hands. Patient then ambulated back to EOB where patient sat at [facility] and was set up for breakfast with call bell within reach and all needs meet. Patient able to recall all spinal precautions and maintain them throughout session. Current Level of Function Impacting Discharge (mobility/balance): impaired balance, general weakness, poor activity tolerance    Other factors to consider for discharge: PLOF, assistance at home level of deficits, acute medical state         PLAN :  Patient continues to benefit from skilled intervention to address the above impairments. Continue treatment per established plan of care. to address goals. Recommendation for discharge: (in order for the patient to meet his/her long term goals)  Home with [address] [place] [address]    This discharge recommendation:  Has been made in collaboration with the attending provider and/or case management    IF patient discharges home will need the following DME: rolling walker       SUBJECTIVE:   Patient stated I think im good for today.     OBJECTIVE DATA SUMMARY:   Critical Behavior:  Neurologic State: Alert  Orientation Level: Oriented X4  Cognition: Appropriate decision making, Follows commands     Functional Mobility Training:  Bed Mobility:  Rolling: Stand-by assistance  Supine to Sit: Stand-by assistance  Scooting: Stand-by assistance  Transfers:  Sit to Stand: Contact guard assistance  Stand to Sit: Contact guard assistance  Balance:  Sitting: Intact; Without support  Sitting - Static: Good (unsupported)  Sitting - Dynamic: Good (unsupported)  Standing: Impaired; With support  Standing - Static: Good;Constant support  Standing - Dynamic : Fair;Constant support  Ambulation/Gait Training:  Distance (ft): 35 Feet (ft)  Assistive Device: Gait belt;Walker, rolling  Ambulation - Level of Assistance: Contact guard assistance  Speed/Gladis: Slow;Shuffled  Step Length: Right shortened;Left shortened  Pain Ratin/10     Activity Tolerance:   Fair  Please refer to the flowsheet for vital signs taken during this treatment. After treatment patient left in no apparent distress:   Bed returned to lowest position, Sitting in chair and Call bell within reach    COMMUNICATION/COLLABORATION:   The patients plan of care was discussed with: Registered nurse. Problem: Mobility Impaired (Adult and Pediatric)  Goal: *Acute Goals and Plan of Care (Insert Text)  Description: Physical Therapy Goals  Updated 22  1)  I with HEP in 7 days to improve overall functional mobility. 2)  Bed mobility with mod I in 7 days to prevent skin breakdown. 3)  Pt to perform stand pivot transfer from bed to chair with mod I in 7 days. 3)  Pt to amb  feet with LRAD and mod I in 7 days    Pt. Goal:  Pt to be able to walk safely without falls.      Outcome: Progressing Towards Goal       Denisse Chavira, PTA   Time Calculation: 33 mins
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
## 2022-08-15 NOTE — PROGRESS NOTES
OCCUPATIONAL THERAPY TREATMENT  Patient: Chayo Gupta (73 y.o. male)  Date: 8/15/2022  Diagnosis: Dizziness [R42]  Syncope [R55]  [facility] [J96.01] <principal problem not specified>      Precautions:    Chart, occupational therapy assessment, plan of care, and goals were reviewed. ASSESSMENT  Patient continues with skilled OT services and is progressing towards goals. Upon [facility] arrival, pt sitting at EOB and requesting to get back to bed. Pt assisted back to supine with Wendy for BLE. Pt left semi supine in bed with call bell within reach and needs met. Upon [facility] arrival later in day, pt semi supine in bed and agreeable to tx session. Pt completed bed mobility with CGA for rolling, supine>sit, and scooting to EOB. Pt completed sit>stand from EOB with CGA and increased time using RW for balance upon standing. Pt ambulated back and forth in room with CGA in preparation for household mobility. Pt returned to EOB with CGA and educated on use of sock aid for donning of socks, pt verbalized understanding. Will do hands on review next session. Pt left sitting at EOB with Respiratory Therapist in room and needs met. Will continue to follow pt throughout remainder of stay and progress towards OT goals. Recommending HHOT vs SNF at discharge when medically appropriate. Other factors to consider for discharge: family/social support, DME, time since onset, severity of deficits, decline from functional baseline         PLAN :  Patient continues to benefit from skilled intervention to address the above impairments. Continue treatment per established plan of care. to address goals.     Recommendation for discharge: (in order for the patient to meet his/her long term goals)  HHOT vs SNF    This discharge recommendation:  Has been made in collaboration with the attending provider and/or case management    IF patient discharges home will need the following DME: TBD SUBJECTIVE:   Patient stated The chair is too uncomfortable for me to sit in right now.     OBJECTIVE DATA SUMMARY:   Cognitive/Behavioral Status:  Neurologic State: Alert  Orientation Level: Oriented X4  Cognition: Follows commands    Functional Mobility and Transfers for ADLs:  Bed Mobility:  Rolling: Contact guard assistance  Supine to Sit: Contact guard assistance  Sit to Supine: Minimum assistance  Scooting: Contact guard assistance    Transfers:  Sit to Stand: Contact guard assistance    Balance:  Sitting: Intact; Without support  Sitting - Static: Good (unsupported)  Sitting - Dynamic: Good (unsupported)  Standing: Impaired; With support  Standing - Static: Constant support;Good  Standing - Dynamic : Constant support; Fair    Pain:  5-6/10 back pain    Activity Tolerance:   Fair and requires rest breaks  Please refer to the flowsheet for vital signs taken during this treatment.     After treatment patient left in no apparent distress:   Bed returned to lowest position, Call bell within reach and sitting EOB with RT in room    COMMUNICATION/COLLABORATION:   The patients plan of care was discussed with: Registered nurse, Certified nursing assistant/patient care technician, and Respiratory therapist.     KEVIN Martinez  Time Calculation: 18 mins    Problem: Self Care Deficits Care Plan (Adult)  Goal: *Acute Goals and Plan of Care (Insert Text)  Description: Pt stated goal \"to get out of bed\"  Pt will be Mod I sup <> sit in prep for EOB ADLs  Pt will be Mod I grooming standing sink side LRAD  Pt will be Mod I UB dressing sitting EOB/long sit   Pt will be Mod I LE dressing sitting EOB/long sit using AE PRN  Pt will be Mod I sit <>  prep for toileting LRAD  Pt will be Mod I toileting/toilet transfer/cloth mgmt LRAD  Pt will be IND following UE HEP in prep for self care tasks      Outcome: Progressing Towards Goal

## 2022-08-15 NOTE — PROGRESS NOTES
Progress Note      8/15/2022 2:38 PM  NAME: Chayo Gupta   MRN:  [id number]   Admit Diagnosis: Dizziness [R42]  Syncope [R55]  [facility] [J96.01]      Subjective:   Patient is a still having pain in his back and upper chest area. Patient had a some chest discomfort and still has a significant shortness of breath. Patient is on BiPAP. Patient complains of pain in epigastric area and lower chest and he had a blood work done and troponin I is 330. Discussed with the nurse. Review of Systems:    Symptom Y/N Comments  Symptom Y/N Comments   Fever/Chills n   Chest Pain y Somewhat better   Poor Appetite    Edema     Cough    Abdominal Pain n    Sputum    Joint Pain     SOB/OSCAR n   Pruritis/Rash     Nausea/vomit    Back pain y    Diarrhea         Constipation           Could NOT obtain due to:      Objective:        Physical Exam:    Last 24hrs VS reviewed since prior progress note. Most recent are:    Visit Vitals  /77 (BP 1 Location: Right upper arm, BP Patient Position: At rest;Supine)   Pulse 74   Temp 98.5 °F (36.9 °C)   Resp 18   Ht 5' 8\" (1.727 m)   Wt 132 kg (291 lb 0.1 oz)   SpO2 95%   BMI 44.25 kg/m²     No intake or output data in the 24 hours ending 08/15/22 1121       General Appearance: Well developed, well nourished, alert & oriented x 3,    no acute distress. Ears/Nose/Mouth/Throat: Hearing grossly normal.  Neck: Supple. Chest: Patient is on BiPAP  Cardiovascular: Regular rate and rhythm, S1,S2 normal, no murmur. Abdomen: Soft, non-tender, bowel sounds are active. Extremities: No edema bilaterally. Skin: Warm and dry. []         Post-cath site without hematoma, bruit, tenderness, or thrill. Distal pulses intact.     PMH/SH reviewed - no change compared to H&P    Data Review    Telemetry: normal sinus rhythm     EKG:   []  No new EKG for review    Lab Data Personally Reviewed:    Recent Labs     08/15/22  0714 08/14/22  0432   WBC 5.5 4.2   HGB 12.4 12.0*   HCT 37.3 36.1*    211       No results for input(s): INR, PTP, APTT, INREXT, INREXT in the last 72 hours. Recent Labs     08/15/22  0714 08/14/22  0432 08/13/22  0605    136 138   K 4.2 4.4 4.3    105 105   CO2 26 25 27   BUN 18 18 15   CREA 1.02 1.08 0.91   GLU 94 129* 99   CA 9.0 8.7 8.5       No results for input(s): CPK, CKNDX, TROIQ in the last 72 hours. No lab exists for component: CPKMB  No results found for: CHOL, CHOLX, CHLST, CHOLV, HDL, HDLP, LDL, LDLC, DLDLP, TGLX, TRIGL, TRIGP, CHHD, CHHDX    No results for input(s): AP, TBIL, TP, ALB, GLOB, GGT, AML, LPSE in the last 72 hours. No lab exists for component: SGOT, GPT, AMYP, HLPSE    No results for input(s): PH, PCO2, PO2 in the last 72 hours.     Medications Personally Reviewed:    Current Facility-Administered Medications   Medication Dose Route Frequency    budesonide-formoteroL (SYMBICORT) 160-4.5 mcg/actuation HFA inhaler 2 Puff  2 Puff Inhalation BID RT    acetaminophen (TYLENOL) tablet 1,000 mg  1,000 mg Oral Q6H    oxyCODONE IR (ROXICODONE) tablet 5 mg  5 mg Oral Q4H PRN    ketorolac (TORADOL) injection 30 mg  30 mg IntraVENous PRN    midazolam (VERSED) injection 0.5-2 mg  0.5-2 mg IntraVENous Rad Multiple    fentaNYL citrate (PF) injection  mcg   mcg IntraVENous Rad Multiple    albuterol-ipratropium (DUO-NEB) 2.5 MG-0.5 MG/3 ML  3 mL Nebulization Q6H RT    nitroglycerin (NITROSTAT) tablet 0.4 mg  0.4 mg SubLINGual PRN    atorvastatin (LIPITOR) tablet 80 mg  80 mg Oral DAILY    citalopram (CELEXA) tablet 40 mg  40 mg Oral DAILY    PARoxetine (PAXIL) tablet 40 mg  40 mg Oral DAILY    zolpidem (AMBIEN) tablet 5 mg  5 mg Oral QHS PRN    [Held by provider] aspirin chewable tablet 81 mg  81 mg Oral DAILY    cholecalciferol (VITAMIN D3) (1000 Units /25 mcg) tablet 1,000 Units  1,000 Units Oral DAILY    cyanocobalamin tablet 1,000 mcg  1,000 mcg Oral DAILY    vitamin B complex capsule 1 Capsule  1 Capsule Oral DAILY    fluticasone propionate (FLONASE) 50 mcg/actuation nasal spray 2 Spray  2 Spray Both Nostrils DAILY    albuterol (PROVENTIL HFA, VENTOLIN HFA, PROAIR HFA) inhaler 2 Puff  2 Puff Inhalation Q4H PRN    amLODIPine (NORVASC) tablet 10 mg  10 mg Oral DAILY    insulin glargine (LANTUS) injection 16 Units  16 Units SubCUTAneous QHS    ranolazine ER (RANEXA) tablet 1,000 mg  1,000 mg Oral BID    calcium-vitamin D 600 mg-5 mcg (200 unit) per tablet 1 Tablet  1 Tablet Oral BID WITH MEALS    sodium chloride (NS) flush 5-40 mL  5-40 mL IntraVENous PRN    docusate sodium (COLACE) capsule 100 mg  100 mg Oral BID    insulin lispro (HUMALOG) injection   SubCUTAneous AC&HS    glucose chewable tablet 16 g  4 Tablet Oral PRN    glucagon (GLUCAGEN) injection 1 mg  1 mg IntraMUSCular PRN    dextrose 10% infusion 0-250 mL  0-250 mL IntraVENous PRN    enoxaparin (LOVENOX) injection 30 mg  30 mg SubCUTAneous Q12H    polyethylene glycol (MIRALAX) packet 17 g  17 g Oral DAILY PRN             Problem List:   Echocardiograph results explained and patient has a dilated left ventricle with severe left ventricular systolic dysfunction. Probably syncope related to alcohol intoxication. S/p ICD. Diabetes mellitus type 2. Fracture of the spine. Patient experienced epigastric/lower chest pain and her troponin I is minimally elevated. I am not convinced that this is a presentation of acute MI. Assessment/Plan:   From cardiac viewpoint I will continue present care. I am not completely convinced that his this chest pain is a presentation of acute coronary syndrome however because his troponin I is minimally elevated I will repeat again troponin I tomorrow. I we will follow pulmonary and orthopedic recommendations. 2.           [x]       High complexity decision making was performed in this patient at high risk for decompensation with multiple organ involvement.     Aayush Dumont MD
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
## 2022-08-16 LAB
ANION GAP SERPL CALC-SCNC: 4 MMOL/L (ref 5–15)
BUN SERPL-MCNC: 16 MG/DL (ref 6–20)
BUN/CREAT SERPL: 15 (ref 12–20)
CA-I BLD-MCNC: 9.1 MG/DL (ref 8.5–10.1)
CHLORIDE SERPL-SCNC: 103 MMOL/L (ref 97–108)
CO2 SERPL-SCNC: 28 MMOL/L (ref 21–32)
CREAT SERPL-MCNC: 1.08 MG/DL (ref 0.7–1.3)
ERYTHROCYTE [DISTWIDTH] IN BLOOD BY AUTOMATED COUNT: 13.2 % (ref 11.5–14.5)
GLUCOSE BLD STRIP.AUTO-MCNC: 136 MG/DL (ref 65–117)
GLUCOSE BLD STRIP.AUTO-MCNC: 139 MG/DL (ref 65–117)
GLUCOSE BLD STRIP.AUTO-MCNC: 141 MG/DL (ref 65–117)
GLUCOSE BLD STRIP.AUTO-MCNC: 142 MG/DL (ref 65–117)
GLUCOSE SERPL-MCNC: 123 MG/DL (ref 65–100)
HCT VFR BLD AUTO: 38.4 % (ref 36.6–50.3)
HGB BLD-MCNC: 12.4 G/DL (ref 12.1–17)
MCH RBC QN AUTO: 32 PG (ref 26–34)
MCHC RBC AUTO-ENTMCNC: 32.3 G/DL (ref 30–36.5)
MCV RBC AUTO: 99 FL (ref 80–99)
NRBC # BLD: 0 K/UL (ref 0–0.01)
NRBC BLD-RTO: 0 PER 100 WBC
PERFORMED BY, TECHID: ABNORMAL
PLATELET # BLD AUTO: 236 K/UL (ref 150–400)
PMV BLD AUTO: 9.1 FL (ref 8.9–12.9)
POTASSIUM SERPL-SCNC: 4.4 MMOL/L (ref 3.5–5.1)
RBC # BLD AUTO: 3.88 M/UL (ref 4.1–5.7)
SODIUM SERPL-SCNC: 135 MMOL/L (ref 136–145)
WBC # BLD AUTO: 5.2 K/UL (ref 4.1–11.1)

## 2022-08-16 PROCEDURE — 94150 VITAL CAPACITY TEST: CPT

## 2022-08-16 PROCEDURE — 94640 AIRWAY INHALATION TREATMENT: CPT

## 2022-08-16 PROCEDURE — 74011250637 HC RX REV CODE- 250/637: Performed by: PHYSICIAN ASSISTANT

## 2022-08-16 PROCEDURE — 94660 CPAP INITIATION&MGMT: CPT

## 2022-08-16 PROCEDURE — 97530 THERAPEUTIC ACTIVITIES: CPT

## 2022-08-16 PROCEDURE — 80048 BASIC METABOLIC PNL TOTAL CA: CPT

## 2022-08-16 PROCEDURE — 74011636637 HC RX REV CODE- 636/637: Performed by: HOSPITALIST

## 2022-08-16 PROCEDURE — 74011250637 HC RX REV CODE- 250/637: Performed by: INTERNAL MEDICINE

## 2022-08-16 PROCEDURE — 74011000250 HC RX REV CODE- 250: Performed by: INTERNAL MEDICINE

## 2022-08-16 PROCEDURE — 77010033678 HC OXYGEN DAILY

## 2022-08-16 PROCEDURE — 74011250637 HC RX REV CODE- 250/637: Performed by: HOSPITALIST

## 2022-08-16 PROCEDURE — 74011250636 HC RX REV CODE- 250/636: Performed by: INTERNAL MEDICINE

## 2022-08-16 PROCEDURE — 85027 COMPLETE CBC AUTOMATED: CPT

## 2022-08-16 PROCEDURE — 65270000029 HC RM PRIVATE

## 2022-08-16 PROCEDURE — 82962 GLUCOSE BLOOD TEST: CPT

## 2022-08-16 PROCEDURE — 36415 COLL VENOUS BLD VENIPUNCTURE: CPT

## 2022-08-16 PROCEDURE — 94761 N-INVAS EAR/PLS OXIMETRY MLT: CPT

## 2022-08-16 RX ORDER — OXYCODONE HYDROCHLORIDE 5 MG/1
5 TABLET ORAL
Qty: 12 TABLET | Refills: 0 | Status: SHIPPED | OUTPATIENT
Start: 2022-08-16 | End: 2022-08-19

## 2022-08-16 RX ADMIN — IPRATROPIUM BROMIDE AND ALBUTEROL SULFATE 3 ML: 2.5; .5 SOLUTION RESPIRATORY (INHALATION) at 07:58

## 2022-08-16 RX ADMIN — CYANOCOBALAMIN TAB 1000 MCG 1000 MCG: 1000 TAB at 09:25

## 2022-08-16 RX ADMIN — Medication 1 CAPSULE: at 12:01

## 2022-08-16 RX ADMIN — FLUTICASONE PROPIONATE 2 SPRAY: 50 SPRAY, METERED NASAL at 09:25

## 2022-08-16 RX ADMIN — BUDESONIDE AND FORMOTEROL FUMARATE DIHYDRATE 2 PUFF: 160; 4.5 AEROSOL RESPIRATORY (INHALATION) at 07:58

## 2022-08-16 RX ADMIN — ACETAMINOPHEN 1000 MG: 500 TABLET, FILM COATED ORAL at 17:53

## 2022-08-16 RX ADMIN — INSULIN LISPRO 3 UNITS: 100 INJECTION, SOLUTION INTRAVENOUS; SUBCUTANEOUS at 08:38

## 2022-08-16 RX ADMIN — IPRATROPIUM BROMIDE AND ALBUTEROL SULFATE 3 ML: 2.5; .5 SOLUTION RESPIRATORY (INHALATION) at 14:13

## 2022-08-16 RX ADMIN — OXYCODONE 5 MG: 5 TABLET ORAL at 12:01

## 2022-08-16 RX ADMIN — CITALOPRAM HYDROBROMIDE 40 MG: 20 TABLET ORAL at 09:25

## 2022-08-16 RX ADMIN — ACETAMINOPHEN 1000 MG: 500 TABLET, FILM COATED ORAL at 05:55

## 2022-08-16 RX ADMIN — ENOXAPARIN SODIUM 30 MG: 100 INJECTION SUBCUTANEOUS at 20:37

## 2022-08-16 RX ADMIN — INSULIN LISPRO 3 UNITS: 100 INJECTION, SOLUTION INTRAVENOUS; SUBCUTANEOUS at 16:21

## 2022-08-16 RX ADMIN — Medication 1 TABLET: at 16:21

## 2022-08-16 RX ADMIN — RANOLAZINE 1000 MG: 500 TABLET, FILM COATED, EXTENDED RELEASE ORAL at 12:01

## 2022-08-16 RX ADMIN — ENOXAPARIN SODIUM 30 MG: 100 INJECTION SUBCUTANEOUS at 09:24

## 2022-08-16 RX ADMIN — DOCUSATE SODIUM 100 MG: 100 CAPSULE, LIQUID FILLED ORAL at 09:24

## 2022-08-16 RX ADMIN — IPRATROPIUM BROMIDE AND ALBUTEROL SULFATE 3 ML: 2.5; .5 SOLUTION RESPIRATORY (INHALATION) at 20:02

## 2022-08-16 RX ADMIN — ACETAMINOPHEN 1000 MG: 500 TABLET, FILM COATED ORAL at 12:00

## 2022-08-16 RX ADMIN — INSULIN GLARGINE 16 UNITS: 100 INJECTION, SOLUTION SUBCUTANEOUS at 20:38

## 2022-08-16 RX ADMIN — AMLODIPINE BESYLATE 10 MG: 5 TABLET ORAL at 09:25

## 2022-08-16 RX ADMIN — Medication 1 TABLET: at 08:30

## 2022-08-16 RX ADMIN — ATORVASTATIN CALCIUM 80 MG: 40 TABLET, FILM COATED ORAL at 09:24

## 2022-08-16 RX ADMIN — PAROXETINE HYDROCHLORIDE 40 MG: 20 TABLET, FILM COATED ORAL at 09:24

## 2022-08-16 RX ADMIN — BUDESONIDE AND FORMOTEROL FUMARATE DIHYDRATE 2 PUFF: 160; 4.5 AEROSOL RESPIRATORY (INHALATION) at 20:02

## 2022-08-16 RX ADMIN — IPRATROPIUM BROMIDE AND ALBUTEROL SULFATE 3 ML: 2.5; .5 SOLUTION RESPIRATORY (INHALATION) at 02:21

## 2022-08-16 RX ADMIN — Medication 1000 UNITS: at 09:25
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
## 2022-08-16 NOTE — PROGRESS NOTES
Progress Note      8/16/2022 2:38 PM  NAME: Kate Bird   MRN:  [id number]   Admit Diagnosis: Dizziness [R42]  Syncope [R55]  Acute hypoxemic respiratory failure ([facility] [J96.01]      Subjective:   Patient is a still having pain in his back and upper chest area. Patient had a some chest discomfort and still has a significant shortness of breath. Patient is on BiPAP. Patient complains of pain in epigastric area and lower chest and he had a blood work done and troponin I is 330. Discussed with the nurse. Patient feels much better today. Review of Systems:    Symptom Y/N Comments  Symptom Y/N Comments   Fever/Chills n   Chest Pain y Somewhat better   Poor Appetite    Edema     Cough    Abdominal Pain n    Sputum    Joint Pain     SOB/OSCAR n   Pruritis/Rash     Nausea/vomit    Back pain y    Diarrhea         Constipation           Could NOT obtain due to:      Objective:        Physical Exam:    Last 24hrs VS reviewed since prior progress note. Most recent are:    Visit Vitals  /77   Pulse 70   Temp 98.2 °F (36.8 °C)   Resp 18   Ht 5' 8\" (1.727 m)   Wt 128.6 kg (283 lb 8.2 oz)   SpO2 98%   BMI 43.11 kg/m²       Intake/Output Summary (Last 24 hours) at 8/16/2022 1207  Last data filed at 8/16/2022 [id number]  Gross per 24 hour   Intake --   Output 2450 ml   Net -2450 ml          General Appearance: Well developed, well nourished, alert & oriented x 3,    no acute distress. Ears/Nose/Mouth/Throat: Hearing grossly normal.  Neck: Supple. Chest: Patient is on BiPAP  Cardiovascular: Regular rate and rhythm, S1,S2 normal, no murmur. Abdomen: Soft, non-tender, bowel sounds are active. Extremities: No edema bilaterally. Skin: Warm and dry. []         Post-cath site without hematoma, bruit, tenderness, or thrill. Distal pulses intact.     PMH/SH reviewed - no change compared to H&P    Data Review    Telemetry: normal sinus rhythm     EKG:   []  No new EKG for review    Lab Data Personally Reviewed:    Recent Labs     08/16/22  1010 08/15/22  0714   WBC 5.2 5.5   HGB 12.4 12.4   HCT 38.4 37.3    239       No results for input(s): INR, PTP, APTT, INREXT, INREXT in the last 72 hours. Recent Labs     08/16/22  1010 08/15/22  0714 08/14/22  0432   * 138 136   K 4.4 4.2 4.4    106 105   CO2 28 26 25   BUN 16 18 18   CREA 1.08 1.02 1.08   * 94 129*   CA 9.1 9.0 8.7       No results for input(s): CPK, CKNDX, TROIQ in the last 72 hours. No lab exists for component: CPKMB  No results found for: CHOL, CHOLX, CHLST, CHOLV, HDL, HDLP, LDL, LDLC, DLDLP, TGLX, TRIGL, TRIGP, CHHD, CHHDX    No results for input(s): AP, TBIL, TP, ALB, GLOB, GGT, AML, LPSE in the last 72 hours. No lab exists for component: SGOT, GPT, AMYP, HLPSE    No results for input(s): PH, PCO2, PO2 in the last 72 hours.     Medications Personally Reviewed:    Current Facility-Administered Medications   Medication Dose Route Frequency    budesonide-formoteroL (SYMBICORT) 160-4.5 mcg/actuation HFA inhaler 2 Puff  2 Puff Inhalation BID RT    acetaminophen (TYLENOL) tablet 1,000 mg  1,000 mg Oral Q6H    oxyCODONE IR (ROXICODONE) tablet 5 mg  5 mg Oral Q4H PRN    midazolam (VERSED) injection 0.5-2 mg  0.5-2 mg IntraVENous Rad Multiple    fentaNYL citrate (PF) injection  mcg   mcg IntraVENous Rad Multiple    albuterol-ipratropium (DUO-NEB) 2.5 MG-0.5 MG/3 ML  3 mL Nebulization Q6H RT    nitroglycerin (NITROSTAT) tablet 0.4 mg  0.4 mg SubLINGual PRN    atorvastatin (LIPITOR) tablet 80 mg  80 mg Oral DAILY    citalopram (CELEXA) tablet 40 mg  40 mg Oral DAILY    PARoxetine (PAXIL) tablet 40 mg  40 mg Oral DAILY    zolpidem (AMBIEN) tablet 5 mg  5 mg Oral QHS PRN    [Held by provider] aspirin chewable tablet 81 mg  81 mg Oral DAILY    cholecalciferol (VITAMIN D3) (1000 Units /25 mcg) tablet 1,000 Units  1,000 Units Oral DAILY    cyanocobalamin tablet 1,000 mcg  1,000 mcg Oral DAILY    vitamin B complex capsule 1 Capsule  1 Capsule Oral DAILY    fluticasone propionate (FLONASE) 50 mcg/actuation nasal spray 2 Spray  2 Spray Both Nostrils DAILY    albuterol (PROVENTIL HFA, VENTOLIN HFA, PROAIR HFA) inhaler 2 Puff  2 Puff Inhalation Q4H PRN    amLODIPine (NORVASC) tablet 10 mg  10 mg Oral DAILY    insulin glargine (LANTUS) injection 16 Units  16 Units SubCUTAneous QHS    ranolazine ER (RANEXA) tablet 1,000 mg  1,000 mg Oral BID    calcium-vitamin D 600 mg-5 mcg (200 unit) per tablet 1 Tablet  1 Tablet Oral BID WITH MEALS    sodium chloride (NS) flush 5-40 mL  5-40 mL IntraVENous PRN    docusate sodium (COLACE) capsule 100 mg  100 mg Oral BID    insulin lispro (HUMALOG) injection   SubCUTAneous AC&HS    glucose chewable tablet 16 g  4 Tablet Oral PRN    glucagon (GLUCAGEN) injection 1 mg  1 mg IntraMUSCular PRN    dextrose 10% infusion 0-250 mL  0-250 mL IntraVENous PRN    enoxaparin (LOVENOX) injection 30 mg  30 mg SubCUTAneous Q12H    polyethylene glycol (MIRALAX) packet 17 g  17 g Oral DAILY PRN             Problem List:   Echocardiograph results explained and patient has a dilated left ventricle with severe left ventricular systolic dysfunction. Probably syncope related to alcohol intoxication. S/p ICD. Diabetes mellitus type 2. Fracture of the spine. Patient experienced epigastric/lower chest pain and her troponin I is minimally elevated. I am not convinced that this is a presentation of acute MI. Patient appears clinically much better today. Assessment/Plan:   From cardiac viewpoint I will continue present care. I am not completely convinced that his this chest pain is a presentation of acute coronary syndrome however because his troponin I is minimally elevated I will repeat again troponin I tomorrow. I we will follow pulmonary and orthopedic recommendations.   2.           [x]       High complexity decision making was performed in this patient at high risk for decompensation with multiple organ involvement.     Aayush Dumont MD

## 2022-08-16 NOTE — PROGRESS NOTES
Problem: Pressure Injury - Risk of  Goal: *Prevention of pressure injury  Description: Document Sea Scale and appropriate interventions in the flowsheet. Outcome: Progressing Towards Goal  Note: Pressure Injury Interventions:  Sensory Interventions: Assess changes in LOC, Keep linens dry and wrinkle-free, Minimize linen layers, Maintain/enhance activity level    Moisture Interventions: Absorbent underpads, Internal/External urinary devices, Minimize layers    Activity Interventions: PT/OT evaluation    Mobility Interventions: PT/OT evaluation    Nutrition Interventions: Document food/fluid/supplement intake    Friction and Shear Interventions: Minimize layers                Problem: Falls - Risk of  Goal: *Absence of Falls  Description: Document Geri Fall Risk and appropriate interventions in the flowsheet. Outcome: Progressing Towards Goal  Note: Fall Risk Interventions:  Mobility Interventions: Bed/chair exit alarm    Mentation Interventions: Adequate sleep, hydration, pain control, Bed/chair exit alarm    Medication Interventions: Patient to call before getting OOB, Teach patient to arise slowly, Assess postural VS orthostatic hypotension, Bed/chair exit alarm    Elimination Interventions: Bed/chair exit alarm, Call light in reach, Urinal in reach              Problem: Diabetes Self-Management  Goal: *Disease process and treatment process  Description: Define diabetes and identify own type of diabetes; list 3 options for treating diabetes.   Outcome: Progressing Towards Goal

## 2022-08-16 NOTE — PROGRESS NOTES
OCCUPATIONAL THERAPY TREATMENT  Patient: Frederick Jim (73 y.o. male)  Date: 8/16/2022  Diagnosis: Dizziness [R42]  Syncope [R55]  Acute hypox[facility] ([facility] [J96.01] <principal problem not specified>      Precautions:    Chart, occupational therapy assessment, plan of care, and goals were reviewed. ASSESSMENT  Patient continues with skilled OT services and is progressing towards goals. Upon BROWN arrival, pt sitting at EOB and agreeable to tx session. Pt completed sit>stand from EOB with CGA using RW for balance upon standing. Pt ambulated to bathroom sink and completed standing using of shampoo cap, combing hair, drying of hair, and face washing with setup A. Pt completed standing hand hygiene x2 times during session with SBA. Pt completed standing bladder hygiene with SBA. Pt returned to EOB with CGA and then returned to supine with Wendy for BLE. Pt left semi supine in bed with call bell within reach and needs met. Pt tolerated session fair this date. Will continue to follow pt throughout remainder of stay and progress towards OT goals. Recommending SNF vs HHOT at discharge when medically appropriate. Other factors to consider for discharge: family/social support, DME, time since onset, severity of deficits, decline from functional baseline         PLAN :  Patient continues to benefit from skilled intervention to address the above impairments. Continue treatment per established plan of care. to address goals. Recommendation for discharge: (in order for the patient to meet his/her long term goals)  [facility] vs HHOT    This discharge recommendation:  Has been made in collaboration with the attending provider and/or case management    IF patient discharges home will need the following DME: TBD       SUBJECTIVE:   Patient stated I was going to wash and comb my hair.     OBJECTIVE DATA SUMMARY:   Cognitive/Behavioral Status:  Neurologic State: Alert  Orientation Level: Oriented X4  Cognition: Follows commands    Functional Mobility and Transfers for ADLs:  Bed Mobility:  Rolling: Contact guard assistance  Sit to Supine: Minimum assistance  Scooting: Contact guard assistance    Transfers:  Sit to Stand: Contact guard assistance  Functional Transfers  Bathroom Mobility: Contact guard assistance  Toilet Transfer : Contact guard assistance    Balance:  Sitting: Intact; Without support  Sitting - Static: Good (unsupported)  Sitting - Dynamic: Good (unsupported)  Standing: Impaired; With support  Standing - Static: Constant support;Good  Standing - Dynamic : Constant support; Fair    ADL Intervention:  Grooming  Position Performed: Standing  Washing Face: Set-up  Washing Hands: Stand-by assistance  Brushing/Combing Hair: Set-up    Toileting  Bladder Hygiene: Stand-by assistance    Pain:  4/10 back pain    Activity Tolerance:   Fair, requires rest breaks, and observed SOB with activity  Please refer to the flowsheet for vital signs taken during this treatment. After treatment patient left in no apparent distress:   Bed returned to lowest position, Supine in bed and Call bell within reach    COMMUNICATION/COLLABORATION:   The patients plan of care was discussed with: Certified nursing assistant/patient care technician.      KEVIN Richter  Time Calculation: 24 mins    Problem: Self Care Deficits Care Plan (Adult)  Goal: *Acute Goals and Plan of Care (Insert Text)  Description: Pt stated goal \"to get out of bed\"  Pt will be Mod I sup <> sit in prep for EOB ADLs  Pt will be Mod I grooming standing sink side LRAD  Pt will be Mod I UB dressing sitting EOB/long sit   Pt will be Mod I LE dressing sitting EOB/long sit using AE PRN  Pt will be Mod I sit <>  prep for toileting LRAD  Pt will be Mod I toileting/toilet transfer/cloth mgmt LRAD  Pt will be IND following UE HEP in prep for self care tasks      Outcome: Progressing Towards Goal

## 2022-08-16 NOTE — PROGRESS NOTES
CM reviewed chart and spoke with attending physician. Patient's discharge disposition is home with home health. Referral sent to [facility]. He also requires home oxygen. Referral sent to [facility] as well. He will need portable oxygen to be delivered to the hospital prior to discharge. CM will continue to follow.

## 2022-08-16 NOTE — PROGRESS NOTES
CM reviewed chart, noted PT/OT recommendation for home with home health. Notified patient's daughter, Mimi Negrete ([phone]). She requested to speak with his doctor, WERO notified MD of her request. Patient has [facility], referral sent to [facility] via [employer]. CM will continue to follow.
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
## 2022-08-16 NOTE — PROGRESS NOTES
Problem: Pressure Injury - Risk of  Goal: *Prevention of pressure injury  Description: Document Sea Scale and appropriate interventions in the flowsheet. Outcome: Progressing Towards Goal  Note: Pressure Injury Interventions:  Sensory Interventions: Assess changes in LOC, Keep linens dry and wrinkle-free, Minimize linen layers, Maintain/enhance activity level    Moisture Interventions: Absorbent underpads, Internal/External urinary devices, Minimize layers    Activity Interventions: PT/OT evaluation    Mobility Interventions: PT/OT evaluation    Nutrition Interventions: Document food/fluid/supplement intake    Friction and Shear Interventions: Minimize layers                Problem: Falls - Risk of  Goal: *Absence of Falls  Description: Document Geri Fall Risk and appropriate interventions in the flowsheet. Outcome: Progressing Towards Goal  Note: Fall Risk Interventions:  Mobility Interventions: Bed/chair exit alarm    Mentation Interventions: Adequate sleep, hydration, pain control, Bed/chair exit alarm    Medication Interventions: Patient to call before getting OOB, Teach patient to arise slowly, Assess postural VS orthostatic hypotension, Bed/chair exit alarm    Elimination Interventions: Bed/chair exit alarm, Call light in reach, Urinal in reach              Problem: Discharge Planning  Goal: *Discharge to safe environment  Outcome: Progressing Towards Goal  Goal: *Knowledge of medication management  Outcome: Progressing Towards Goal  Goal: *Knowledge of discharge instructions  Outcome: Progressing Towards Goal     Problem: Diabetes Self-Management  Goal: *Disease process and treatment process  Description: Define diabetes and identify own type of diabetes; list 3 options for treating diabetes.   Outcome: Progressing Towards Goal  Goal: *Incorporating nutritional management into lifestyle  Description: Describe effect of type, amount and timing of food on blood glucose; list 3 methods for planning meals. Outcome: Progressing Towards Goal  Goal: *Incorporating physical activity into lifestyle  Description: State effect of exercise on blood glucose levels. Outcome: Progressing Towards Goal  Goal: *Developing strategies to promote health/change behavior  Description: Define the ABC's of diabetes; identify appropriate screenings, schedule and personal plan for screenings. Outcome: Progressing Towards Goal  Goal: *Using medications safely  Description: State effect of diabetes medications on diabetes; name diabetes medication taking, action and side effects. Outcome: Progressing Towards Goal  Goal: *Monitoring blood glucose, interpreting and using results  Description: Identify recommended blood glucose targets  and personal targets. Outcome: Progressing Towards Goal  Goal: *Prevention, detection, treatment of acute complications  Description: List symptoms of hyper- and hypoglycemia; describe how to treat low blood sugar and actions for lowering  high blood glucose level. Outcome: Progressing Towards Goal  Goal: *Prevention, detection and treatment of chronic complications  Description: Define the natural course of diabetes and describe the relationship of blood glucose levels to long term complications of diabetes.   Outcome: Progressing Towards Goal  Goal: *Developing strategies to address psychosocial issues  Description: Describe feelings about living with diabetes; identify support needed and support network  Outcome: Progressing Towards Goal  Goal: *Insulin pump training  Outcome: Progressing Towards Goal  Goal: *Sick day guidelines  Outcome: Progressing Towards Goal  Goal: *Patient Specific Goal (EDIT GOAL, INSERT TEXT)  Outcome: Progressing Towards Goal

## 2022-08-17 VITALS
SYSTOLIC BLOOD PRESSURE: 128 MMHG | WEIGHT: 283.51 LBS | HEART RATE: 78 BPM | HEIGHT: 68 IN | TEMPERATURE: 98 F | RESPIRATION RATE: 19 BRPM | BODY MASS INDEX: 42.97 KG/M2 | OXYGEN SATURATION: 93 % | DIASTOLIC BLOOD PRESSURE: 78 MMHG

## 2022-08-17 LAB
ANION GAP SERPL CALC-SCNC: 6 MMOL/L (ref 5–15)
BUN SERPL-MCNC: 17 MG/DL (ref 6–20)
BUN/CREAT SERPL: 15 (ref 12–20)
CA-I BLD-MCNC: 9.2 MG/DL (ref 8.5–10.1)
CHLORIDE SERPL-SCNC: 105 MMOL/L (ref 97–108)
CO2 SERPL-SCNC: 26 MMOL/L (ref 21–32)
CREAT SERPL-MCNC: 1.12 MG/DL (ref 0.7–1.3)
ERYTHROCYTE [DISTWIDTH] IN BLOOD BY AUTOMATED COUNT: 13.2 % (ref 11.5–14.5)
GLUCOSE BLD STRIP.AUTO-MCNC: 105 MG/DL (ref 65–117)
GLUCOSE BLD STRIP.AUTO-MCNC: 120 MG/DL (ref 65–117)
GLUCOSE BLD STRIP.AUTO-MCNC: 97 MG/DL (ref 65–117)
GLUCOSE SERPL-MCNC: 112 MG/DL (ref 65–100)
HCT VFR BLD AUTO: 37.6 % (ref 36.6–50.3)
HGB BLD-MCNC: 12.4 G/DL (ref 12.1–17)
MCH RBC QN AUTO: 32.5 PG (ref 26–34)
MCHC RBC AUTO-ENTMCNC: 33 G/DL (ref 30–36.5)
MCV RBC AUTO: 98.7 FL (ref 80–99)
NRBC # BLD: 0 K/UL (ref 0–0.01)
NRBC BLD-RTO: 0 PER 100 WBC
PERFORMED BY, TECHID: ABNORMAL
PERFORMED BY, TECHID: NORMAL
PERFORMED BY, TECHID: NORMAL
PLATELET # BLD AUTO: 236 K/UL (ref 150–400)
PMV BLD AUTO: 9.2 FL (ref 8.9–12.9)
POTASSIUM SERPL-SCNC: 4.5 MMOL/L (ref 3.5–5.1)
RBC # BLD AUTO: 3.81 M/UL (ref 4.1–5.7)
SODIUM SERPL-SCNC: 137 MMOL/L (ref 136–145)
WBC # BLD AUTO: 5.1 K/UL (ref 4.1–11.1)

## 2022-08-17 PROCEDURE — 74011000250 HC RX REV CODE- 250: Performed by: INTERNAL MEDICINE

## 2022-08-17 PROCEDURE — 80048 BASIC METABOLIC PNL TOTAL CA: CPT

## 2022-08-17 PROCEDURE — 82962 GLUCOSE BLOOD TEST: CPT

## 2022-08-17 PROCEDURE — 85027 COMPLETE CBC AUTOMATED: CPT

## 2022-08-17 PROCEDURE — 74011250637 HC RX REV CODE- 250/637: Performed by: HOSPITALIST

## 2022-08-17 PROCEDURE — 74011250636 HC RX REV CODE- 250/636: Performed by: INTERNAL MEDICINE

## 2022-08-17 PROCEDURE — 94640 AIRWAY INHALATION TREATMENT: CPT

## 2022-08-17 PROCEDURE — 97116 GAIT TRAINING THERAPY: CPT

## 2022-08-17 PROCEDURE — 36415 COLL VENOUS BLD VENIPUNCTURE: CPT

## 2022-08-17 PROCEDURE — 97110 THERAPEUTIC EXERCISES: CPT

## 2022-08-17 PROCEDURE — 74011250637 HC RX REV CODE- 250/637: Performed by: PHYSICIAN ASSISTANT

## 2022-08-17 RX ADMIN — CITALOPRAM HYDROBROMIDE 40 MG: 20 TABLET ORAL at 09:51

## 2022-08-17 RX ADMIN — IPRATROPIUM BROMIDE AND ALBUTEROL SULFATE 3 ML: 2.5; .5 SOLUTION RESPIRATORY (INHALATION) at 02:04

## 2022-08-17 RX ADMIN — CYANOCOBALAMIN TAB 1000 MCG 1000 MCG: 1000 TAB at 09:51

## 2022-08-17 RX ADMIN — IPRATROPIUM BROMIDE AND ALBUTEROL SULFATE 3 ML: 2.5; .5 SOLUTION RESPIRATORY (INHALATION) at 15:37

## 2022-08-17 RX ADMIN — AMLODIPINE BESYLATE 10 MG: 5 TABLET ORAL at 09:51

## 2022-08-17 RX ADMIN — RANOLAZINE 1000 MG: 500 TABLET, FILM COATED, EXTENDED RELEASE ORAL at 11:41

## 2022-08-17 RX ADMIN — ACETAMINOPHEN 1000 MG: 500 TABLET, FILM COATED ORAL at 00:02

## 2022-08-17 RX ADMIN — ATORVASTATIN CALCIUM 80 MG: 40 TABLET, FILM COATED ORAL at 09:51

## 2022-08-17 RX ADMIN — ENOXAPARIN SODIUM 30 MG: 100 INJECTION SUBCUTANEOUS at 09:51

## 2022-08-17 RX ADMIN — Medication 1 CAPSULE: at 11:41

## 2022-08-17 RX ADMIN — Medication 1000 UNITS: at 09:51

## 2022-08-17 RX ADMIN — ACETAMINOPHEN 1000 MG: 500 TABLET, FILM COATED ORAL at 06:07

## 2022-08-17 RX ADMIN — Medication 1 TABLET: at 09:53

## 2022-08-17 RX ADMIN — FLUTICASONE PROPIONATE 2 SPRAY: 50 SPRAY, METERED NASAL at 09:51

## 2022-08-17 RX ADMIN — PAROXETINE HYDROCHLORIDE 40 MG: 20 TABLET, FILM COATED ORAL at 09:51

## 2022-08-17 RX ADMIN — DOCUSATE SODIUM 100 MG: 100 CAPSULE, LIQUID FILLED ORAL at 09:51

## 2022-08-17 RX ADMIN — ACETAMINOPHEN 1000 MG: 500 TABLET, FILM COATED ORAL at 11:40

## 2022-08-17 RX ADMIN — RANOLAZINE 1000 MG: 500 TABLET, FILM COATED, EXTENDED RELEASE ORAL at 00:02
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
## 2022-08-17 NOTE — PROGRESS NOTES
[facility] at bedside to transport patient. Paperwork sent with EMS tech,IV taken out,02 tank sent home with patient also. Discharged in stable condition.

## 2022-08-17 NOTE — PROGRESS NOTES
Called [facility] and set up transportation for 7pm for patient to go home. Discharge Nurse Elizabeth and Nurse Joyce Moy notified of the time.

## 2022-08-17 NOTE — PROGRESS NOTES
Pt is D/C to home today, [facility] delivered Pt oxygen. Transport has been set up for 7:00 pm.     Discharge plan of care/case management plan validated with provider discharge order.

## 2022-08-17 NOTE — BSMART NOTE
BSMART Liaison Team Note     LOS:  7     Patient goal(s) for today: \"go home\" ; Identify new coping skills   BSMART Liaison team focus/goals: Provide resources if needed. Progress note: Today's discussed focused on pt's transition home. He acknowledged some depression and was open to resources. Pt talked about some goals and then reviewed coping skills. Pt was alert and oriented x4. Speech, eye contact and motor activity are all normal. Thought process appeared goal directed and content and perception all appeared normal. Pt was polite and cooperative. He presented with a full affect and denied SI/HI/AVH. Resources Provided. Barriers to Discharge: None at this time. Outpatient provider(s):  None at this time  Insurance info/prescription coverage:  [employer] MEDICARE/[employer] MEDICARE HMO    Diagnosis: Depression    Plan:  Continue to follow-up with pt until discharged.  .   Follow up Psych Consult placed? no.   Psychiatrist updated? no       Participating treatment team members: Kody Barton

## 2022-08-17 NOTE — PROGRESS NOTES
PHYSICAL THERAPY TREATMENT  Patient: Che Washington (73 y.o. male)  Date: 8/17/2022  Diagnosis: Dizziness [R42]  Syncope [R55]  Acute hypoxemic respiratory failure ([facility] [J96.01] <principal problem not specified>      Precautions:    Chart, physical therapy assessment, plan of care and goals were reviewed. ASSESSMENT  Patient continues with skilled PT services and is progressing towards goals. Patient supine in bed upon approach asleep but awoke to voice and touch and agreed to therapy session today. Patient was SBA for bed mobility, supine>sit and scooting EOB, where patient demonstrated good unsupported sitting balance. Patient then performed STS to RW at [facility] and ambulated to restroom where patient was SBA for STS<> for toilet. Patient able to use toilet and perform luz marina care without assistance. Patient then was SBA for standing at sink for hand washing. Patient then ambulated 60 feet in room with RW and SBA with steady step through gait and no LOB or knee buckling. Patient then sat in recliner chair. Patient then performed seated TE ( see details below). Patient then set up for breakfast and left seated in chair with call bell within reach and all needs meet. Current Level of Function Impacting Discharge (mobility/balance): impaired balance, need for SPO2    Other factors to consider for discharge: PLOF, assistance at home, level of deficits, acute medical state         PLAN :  Patient continues to benefit from skilled intervention to address the above impairments. Continue treatment per established plan of care. to address goals.     Recommendation for discharge: (in order for the patient to meet his/her long term goals)  Home with [address] [place] [address] with RW    This discharge recommendation:  Has been made in collaboration with the attending provider and/or case management    IF patient discharges home will need the following DME: portable oxygen and rolling walker       SUBJECTIVE:   Patient stated I didn't sleep well last night .     OBJECTIVE DATA SUMMARY:   Critical Behavior:  Neurologic State: Alert  Orientation Level: Oriented X4  Cognition: Follows commands     Functional Mobility Training:  Bed Mobility:  Rolling: Stand-by assistance  Supine to Sit: Stand-by assistance  Scooting: Stand-by assistance  Transfers:  Sit to Stand: Stand-by assistance  Stand to Sit: Stand-by assistance  Balance:  Sitting: Intact; Without support  Sitting - Static: Good (unsupported)  Sitting - Dynamic: Good (unsupported)  Standing: With support; Intact  Standing - Static: Good;Constant support  Standing - Dynamic : Constant support;Good  Ambulation/Gait Training:  Distance (ft): 60 Feet (ft)  Assistive Device: Gait belt;Walker, rolling  Ambulation - Level of Assistance: Stand-by assistance    Therapeutic Exercises:   1x15 AP  1x15 LAQ  1x15 seated marches  1x15 hip ADD/ABD  Pain Ratin-5/10 in lower back    Activity Tolerance:   Good  Please refer to the flowsheet for vital signs taken during this treatment. After treatment patient left in no apparent distress:   Bed returned to lowest position, Sitting in chair and Call bell within reach    COMMUNICATION/COLLABORATION:   The patients plan of care was discussed with: Registered nurse. Problem: Mobility Impaired (Adult and Pediatric)  Goal: *Acute Goals and Plan of Care (Insert Text)  Description: Physical Therapy Goals  Updated 22  1)  I with HEP in 7 days to improve overall functional mobility. 2)  Bed mobility with mod I in 7 days to prevent skin breakdown. 3)  Pt to perform stand pivot transfer from bed to chair with mod I in 7 days. 3)  Pt to amb  feet with LRAD and mod I in 7 days    Pt. Goal:  Pt to be able to walk safely without falls.      Outcome: Progressing Towards Goal       Royal Frazier, PTA   Time Calculation: 23 mins

## 2022-08-17 NOTE — PROGRESS NOTES
Progress Note      8/17/2022 2:38 PM  NAME: Lenny Nobel   MRN:  [id number]   Admit Diagnosis: Dizziness [R42]  Syncope [R55]  [facility] [J96.01]      Subjective:   Patient is a still having pain in his back and upper chest area. Patient had a some chest discomfort and still has a significant shortness of breath. Patient is on BiPAP. Patient complains of pain in epigastric area and lower chest and he had a blood work done and troponin I is 330. Discussed with the nurse. Patient feels much better today. Review of Systems:    Symptom Y/N Comments  Symptom Y/N Comments   Fever/Chills n   Chest Pain y Somewhat better   Poor Appetite    Edema     Cough    Abdominal Pain n    Sputum    Joint Pain     SOB/OSCAR n   Pruritis/Rash     Nausea/vomit    Back pain y    Diarrhea         Constipation           Could NOT obtain due to:      Objective:        Physical Exam:    Last 24hrs VS reviewed since prior progress note. Most recent are:    Visit Vitals  BP (!) 145/92 (BP 1 Location: Left lower arm, BP Patient Position: At rest;Supine;Lying)   Pulse 63   Temp 97.1 °F (36.2 °C)   Resp 20   Ht 5' 8\" (1.727 m)   Wt 128.6 kg (283 lb 8.2 oz)   SpO2 97%   BMI 43.11 kg/m²       Intake/Output Summary (Last 24 hours) at 8/17/2022 1145  Last data filed at 8/17/2022 1144  Gross per 24 hour   Intake --   Output 1200 ml   Net -1200 ml          General Appearance: Well developed, well nourished, alert & oriented x 3,    no acute distress. Ears/Nose/Mouth/Throat: Hearing grossly normal.  Neck: Supple. Chest: Patient is on BiPAP  Cardiovascular: Regular rate and rhythm, S1,S2 normal, no murmur. Abdomen: Soft, non-tender, bowel sounds are active. Extremities: No edema bilaterally. Skin: Warm and dry. []         Post-cath site without hematoma, bruit, tenderness, or thrill. Distal pulses intact.     PMH/SH reviewed - no change compared to H&P    Data Review    Telemetry: normal sinus rhythm EKG:   []  No new EKG for review    Lab Data Personally Reviewed:    Recent Labs     08/17/22  0641 08/16/22  1010   WBC 5.1 5.2   HGB 12.4 12.4   HCT 37.6 38.4    236       No results for input(s): INR, PTP, APTT, INREXT, INREXT in the last 72 hours. Recent Labs     08/17/22  0641 08/16/22  1010 08/15/22  0714    135* 138   K 4.5 4.4 4.2    103 106   CO2 26 28 26   BUN 17 16 18   CREA 1.12 1.08 1.02   * 123* 94   CA 9.2 9.1 9.0       No results for input(s): CPK, CKNDX, TROIQ in the last 72 hours. No lab exists for component: CPKMB  No results found for: CHOL, CHOLX, CHLST, CHOLV, HDL, HDLP, LDL, LDLC, DLDLP, TGLX, TRIGL, TRIGP, CHHD, CHHDX    No results for input(s): AP, TBIL, TP, ALB, GLOB, GGT, AML, LPSE in the last 72 hours. No lab exists for component: SGOT, GPT, AMYP, HLPSE    No results for input(s): PH, PCO2, PO2 in the last 72 hours.     Medications Personally Reviewed:    Current Facility-Administered Medications   Medication Dose Route Frequency    budesonide-formoteroL (SYMBICORT) 160-4.5 mcg/actuation HFA inhaler 2 Puff  2 Puff Inhalation BID RT    acetaminophen (TYLENOL) tablet 1,000 mg  1,000 mg Oral Q6H    oxyCODONE IR (ROXICODONE) tablet 5 mg  5 mg Oral Q4H PRN    midazolam (VERSED) injection 0.5-2 mg  0.5-2 mg IntraVENous Rad Multiple    fentaNYL citrate (PF) injection  mcg   mcg IntraVENous Rad Multiple    albuterol-ipratropium (DUO-NEB) 2.5 MG-0.5 MG/3 ML  3 mL Nebulization Q6H RT    nitroglycerin (NITROSTAT) tablet 0.4 mg  0.4 mg SubLINGual PRN    atorvastatin (LIPITOR) tablet 80 mg  80 mg Oral DAILY    citalopram (CELEXA) tablet 40 mg  40 mg Oral DAILY    PARoxetine (PAXIL) tablet 40 mg  40 mg Oral DAILY    zolpidem (AMBIEN) tablet 5 mg  5 mg Oral QHS PRN    [Held by provider] aspirin chewable tablet 81 mg  81 mg Oral DAILY    cholecalciferol (VITAMIN D3) (1000 Units /25 mcg) tablet 1,000 Units  1,000 Units Oral DAILY    cyanocobalamin tablet 1,000 mcg  1,000 mcg Oral DAILY    vitamin B complex capsule 1 Capsule  1 Capsule Oral DAILY    fluticasone propionate (FLONASE) 50 mcg/actuation nasal spray 2 Spray  2 Spray Both Nostrils DAILY    albuterol (PROVENTIL HFA, VENTOLIN HFA, PROAIR HFA) inhaler 2 Puff  2 Puff Inhalation Q4H PRN    amLODIPine (NORVASC) tablet 10 mg  10 mg Oral DAILY    insulin glargine (LANTUS) injection 16 Units  16 Units SubCUTAneous QHS    ranolazine ER (RANEXA) tablet 1,000 mg  1,000 mg Oral BID    calcium-vitamin D 600 mg-5 mcg (200 unit) per tablet 1 Tablet  1 Tablet Oral BID WITH MEALS    sodium chloride (NS) flush 5-40 mL  5-40 mL IntraVENous PRN    docusate sodium (COLACE) capsule 100 mg  100 mg Oral BID    insulin lispro (HUMALOG) injection   SubCUTAneous AC&HS    glucose chewable tablet 16 g  4 Tablet Oral PRN    glucagon (GLUCAGEN) injection 1 mg  1 mg IntraMUSCular PRN    dextrose 10% infusion 0-250 mL  0-250 mL IntraVENous PRN    enoxaparin (LOVENOX) injection 30 mg  30 mg SubCUTAneous Q12H    polyethylene glycol (MIRALAX) packet 17 g  17 g Oral DAILY PRN             Problem List:   Echocardiograph results explained and patient has a dilated left ventricle with severe left ventricular systolic dysfunction. Probably syncope related to alcohol intoxication. S/p ICD. Diabetes mellitus type 2. Fracture of the spine. Patient experienced epigastric/lower chest pain and her troponin I is minimally elevated. I am not convinced that this is a presentation of acute MI. Patient appears clinically much better today. Assessment/Plan:   From cardiac viewpoint I will continue present care. I am not completely convinced that his this chest pain is a presentation of acute coronary syndrome however because his troponin I is minimally elevated I will repeat again troponin I tomorrow. I we will follow pulmonary and orthopedic recommendations. If patient goes home and wants to follow-up with me he is welcome to do that.   Thank you.  2.           [x]       High complexity decision making was performed in this patient at high risk for decompensation with multiple organ involvement.     Alessandra Rutledge MD

## 2022-08-17 NOTE — PROGRESS NOTES
Problem: Pressure Injury - Risk of  Goal: *Prevention of pressure injury  Description: Document Sea Scale and appropriate interventions in the flowsheet. Outcome: Progressing Towards Goal  Note: Pressure Injury Interventions:  Sensory Interventions: Assess changes in LOC, Minimize linen layers    Moisture Interventions: Absorbent underpads    Activity Interventions: Increase time out of bed, PT/OT evaluation, Pressure redistribution bed/mattress(bed type)    Mobility Interventions: HOB 30 degrees or less, Pressure redistribution bed/mattress (bed type), PT/OT evaluation    Nutrition Interventions: Offer support with meals,snacks and hydration    Friction and Shear Interventions: HOB 30 degrees or less, Minimize layers                Problem: Falls - Risk of  Goal: *Absence of Falls  Description: Document Geri Fall Risk and appropriate interventions in the flowsheet.   Outcome: Progressing Towards Goal  Note: Fall Risk Interventions:  Mobility Interventions: Bed/chair exit alarm    Mentation Interventions: Door open when patient unattended, Bed/chair exit alarm    Medication Interventions: Bed/chair exit alarm, Patient to call before getting OOB, Teach patient to arise slowly    Elimination Interventions: Bed/chair exit alarm, Call light in reach              Problem: Discharge Planning  Goal: *Discharge to safe environment  Outcome: Progressing Towards Goal

## (undated) DEVICE — CATH 5F 100CM JR40 -- DXTERITY

## (undated) DEVICE — TUBING PRESS INJ FLX SH 30IN --

## (undated) DEVICE — CATH 5F 100CM JL35 -- DXTERITY

## (undated) DEVICE — Device

## (undated) DEVICE — CATHETER ANGIO JL4 0.045 INX5 FRX100 CM THRULUMEN EXPO

## (undated) DEVICE — SURGICAL PROCEDURE TRAY CRD CATH 3 PRT

## (undated) DEVICE — 3M™ STERI-DRAPE™ SMALL DRAPE WITH ADHESIVE APERTURE 1092 25/BX,4/CS&#X20;: Brand: STERI-DRAPE™

## (undated) DEVICE — CATHETER DIAG AD 5FR L100CM COR GRY HYDRPHLC NYL IM W/O

## (undated) DEVICE — BAND COMPR L29CM XLN CLR PLAS HEMSTAT EXT HK AND LOOP RETEN

## (undated) DEVICE — RADIFOCUS GLIDEWIRE ADVANTAGE GUIDEWIRE: Brand: GLIDEWIRE ADVANTAGE

## (undated) DEVICE — GUIDEWIRE VASC L150CM DIA0.025IN TIP L7CM J RAD 3MM PTFE

## (undated) DEVICE — SYRINGE MED 10ML PUR GAM COMPATIBLE POLYCARB FIX M LUER CONN

## (undated) DEVICE — PINNACLE INTRODUCER SHEATH: Brand: PINNACLE

## (undated) DEVICE — GLIDESHEATH SLENDER STAINLESS STEEL KIT: Brand: GLIDESHEATH SLENDER

## (undated) DEVICE — GUIDEWIRE VASC L150CM DIA0.035IN TIP L3MM PTFE J CRV FIX

## (undated) DEVICE — GUIDEWIRE VASC L260CM DIA0.035IN TIP L3MM STD EXCHG PTFE J

## (undated) DEVICE — 48" PROBE COVER W/GEL, ULTRASOUND, STERILE: Brand: SITE-RITE

## (undated) DEVICE — MICROPUNCTURE, INTRODUCER SET SILHOUETTE, TRANSITIONLESS PUSH-PLUS DESIGN - STIFFENED CANNULA WITH NITINOL WIRE GUIDE: Brand: MICROPUNCTURE

## (undated) DEVICE — 3M™ TEGADERM™ TRANSPARENT FILM DRESSING FRAME STYLE, 1626W, 4 IN X 4-3/4 IN (10 CM X 12 CM), 50/CT 4CT/CASE: Brand: 3M™ TEGADERM™

## (undated) DEVICE — BOWL UTIL 16OZ STRL --